# Patient Record
Sex: MALE | Race: WHITE | URBAN - METROPOLITAN AREA
[De-identification: names, ages, dates, MRNs, and addresses within clinical notes are randomized per-mention and may not be internally consistent; named-entity substitution may affect disease eponyms.]

---

## 2017-07-18 ENCOUNTER — MEDICAL CORRESPONDENCE (OUTPATIENT)
Dept: HEALTH INFORMATION MANAGEMENT | Facility: CLINIC | Age: 22
End: 2017-07-18

## 2017-07-25 ENCOUNTER — OFFICE VISIT (OUTPATIENT)
Dept: PLASTIC SURGERY | Facility: CLINIC | Age: 22
End: 2017-07-25

## 2017-07-25 VITALS
WEIGHT: 238.3 LBS | OXYGEN SATURATION: 97 % | DIASTOLIC BLOOD PRESSURE: 84 MMHG | HEART RATE: 99 BPM | TEMPERATURE: 99.3 F | SYSTOLIC BLOOD PRESSURE: 123 MMHG | HEIGHT: 65 IN | BODY MASS INDEX: 39.7 KG/M2

## 2017-07-25 DIAGNOSIS — F64.0 GENDER DYSPHORIA IN ADOLESCENT AND ADULT: Primary | ICD-10-CM

## 2017-07-25 RX ORDER — FLUTICASONE PROPIONATE 50 MCG
1 SPRAY, SUSPENSION (ML) NASAL DAILY
COMMUNITY

## 2017-07-25 ASSESSMENT — PAIN SCALES - GENERAL: PAINLEVEL: NO PAIN (0)

## 2017-07-25 NOTE — LETTER
"7/25/2017       RE: Andres Beal  [address]   [facility] [address]     Dear Colleague,    Thank you for referring your patient, Andres Beal, [facility] at [facility]. Please see a copy of my visit note below.    PLASTICS NEW    This 22 year old trans male is here for possible top surgery. He has already had his legal name and gender marker changed in January of this year. Andres was referred by some of our previous patients and came down from [place] to see us.   He has been on testosterone for almost 2 years now with an endocrinologist at [facility].   He has been seeing his therapist Juan Subramanian for the past year.  He has been living his chosen gender role for 2 years now. He binds using a GC2B compression garment and denies any breast problems.    PMH: gender dysphoria. Anxiety/depression - off meds due to side effects. Will restart when he finds a new PMD that is more experienced and supportive with transgender issues. Has exercise/allergy/cold weather induced asthma- uses inhaler PRN about 2 times per week. Denies KIARA, DM, GERD, bleeding/clotting or healing/scarring issues.     PSH: 3rd molar extractions.    FHX: no breast or ovarian CA. Grandparents had CVA, MI, HTN, obesity. Mother has diabetes.    SHX: works as  for [employer]. Has a partner but lives alone. Non-smoker, occasional use of ETOH. Diet- vegetarian, moderate carbs and snacks. Not much pop or coffee. Eats \"fake meat\" and legumes for protein source. Exercise - walks on job, hiking. Sleep= 8-9 hours without problems.     PE: 5'5\", 238 lbs. Masculine appearing biological female.   Moderate breast volume with grade 2-3 ptosis. Decent symmetry but both IMFs at least 1-2 cms too low on thorax. Good pectoralis development and anterior chest contour. Male pattern hair growth and healthy skin elasticity. No masses or nodes on palpation. " Breast tissue fairly fibrofatty. Minimal lateral thoracic rolls. Photos taken with written consent.     A/P: biological female transitioning to male. Meets WPATH criteria for gender affirming top surgery. Will require bilateral SQ mastectomies with nipple grafts.   Has MA so will need to have therapist fax letter of support to our prior auth specialist. Will also need to find a new PMD and get back on his meds for depression and get documentation of mental health clearance. He is hoping for a surgery date sometime in the early fall if possible, but before 2018 for sure.    Total time= 30 minutes, greater than half spent discussing surgical options and insurance issues. Additional discussions about risks and complications as well as a periop cares and limitations will be carried out during his preop visit once we have a surgery date.    Again, thank you for allowing me to participate in the care of your patient.      Sincerely,    Rianna Morales MD

## 2017-07-25 NOTE — NURSING NOTE
"Chief Complaint   Patient presents with     Consult     consult       Vitals:    07/25/17 1141   BP: 123/84   BP Location: Right arm   Patient Position: Chair   Cuff Size: Adult Regular   Pulse: 99   Temp: 99.3  F (37.4  C)   SpO2: 97%   Weight: 238 lb 4.8 oz   Height: 5' 5\"       Body mass index is 39.66 kg/(m^2).    Perez Ornelas MA                          "

## 2017-07-25 NOTE — MR AVS SNAPSHOT
"              After Visit Summary   7/25/2017    Andres Beal    MRN: [id number]           Patient Information     Date Of Birth          1995        Visit Information        Provider Department      7/25/2017 11:00 AM Rianna Morales MD [facility] Plastic and Reconstructive Surgery        Today's Diagnoses     Gender dysphoria in adolescent and adult    -  1       Follow-ups after your visit        Who to contact     Please call your clinic at [phone] to:    Ask questions about your health    Make or cancel appointments    Discuss your medicines    Learn about your test results    Speak to your doctor   If you have compliments or concerns about an experience at your clinic, or if you wish to file a complaint, please contact [facility] Patient Relations at [phone] or email us at [email or web address]         Additional Information About Your Visit        MyChart Information     [employer] gives you secure access to your electronic health record. If you see a primary care provider, you can also send messages to your care team and make appointments. If you have questions, please call your primary care clinic.  If you do not have a primary care provider, please call [phone] and they will assist you.      [email or web address] is an electronic gateway that provides easy, online access to your medical records. With [email or web address], you can request a clinic appointment, read your test results, renew a prescription or communicate with your care team.     To access your existing account, please contact your [facility] Clinic or call [phone] for assistance.        Care EveryWhere ID     This is your Care EveryWhere ID. This could be used by other organizations to access your [facility] medical records  [id number]        Your Vitals Were     Pulse Temperature Height Pulse Oximetry BMI (Body Mass Index)       99 99.3  F (37.4  C) 5' 5\" 97% 39.66 kg/m2        " Blood Pressure from Last 3 Encounters:   07/25/17 123/84    Weight from Last 3 Encounters:   07/25/17 238 lb 4.8 oz              Today, you had the following     No orders found for display       Primary Care Provider    None Specified       No primary provider on file.        Equal Access to Services     CARLOS STILL : Hadii aad ku hadraquelobdulia Mike, wadelmada luqadaha, alexanderta kaalmada geovaniibrahimaheidy, waxadele kimani alexandreanggali noe. So [facility] [phone].    ATENCIÓN: Si habla español, tiene a smith disposición servicios gratuitos de asistencia lingüística. Llame al [phone].    We comply with applicable federal civil rights laws and [place] laws. We do not discriminate on the basis of race, color, national origin, age, disability sex, sexual orientation or gender identity.            Thank you!     Thank you for choosing [facility] PLASTIC AND RECONSTRUCTIVE SURGERY  for your care. Our goal is always to provide you with excellent care. Hearing back from our patients is one way we can continue to improve our services. Please take a few minutes to complete the written survey that you may receive in the mail after your visit with us. Thank you!             Your Updated Medication List - Protect others around you: Learn how to safely use, store and throw away your medicines at www.disposemymeds.org.          This list is accurate as of: 7/25/17 11:59 PM.  Always use your most recent med list.                   Brand Name Dispense Instructions for use Diagnosis    fluticasone 50 MCG/ACT spray    FLONASE     Spray 1 spray into both nostrils daily

## 2017-07-31 ENCOUNTER — MEDICAL CORRESPONDENCE (OUTPATIENT)
Dept: HEALTH INFORMATION MANAGEMENT | Facility: CLINIC | Age: 22
End: 2017-07-31

## 2017-08-06 NOTE — PROGRESS NOTES
"PLASTICS NEW    This 22 year old trans male is here for possible top surgery. He has already had his legal name and gender marker changed in January of this year. Andres was referred by some of our previous patients and came down from [place] to see us.   He has been on testosterone for almost 2 years now with an endocrinologist at [facility].   He has been seeing his therapist Juan Subramanian for the past year.  He has been living his chosen gender role for 2 years now. He binds using a GC2B compression garment and denies any breast problems.    PMH: gender dysphoria. Anxiety/depression - off meds due to side effects. Will restart when he finds a new PMD that is more experienced and supportive with transgender issues. Has exercise/allergy/cold weather induced asthma- uses inhaler PRN about 2 times per week. Denies KIARA, DM, GERD, bleeding/clotting or healing/scarring issues.     PSH: 3rd molar extractions.    FHX: no breast or ovarian CA. Grandparents had CVA, MI, HTN, obesity. Mother has diabetes.    SHX: works as  for [employer]. Has a partner but lives alone. Non-smoker, occasional use of ETOH. Diet- vegetarian, moderate carbs and snacks. Not much pop or coffee. Eats \"fake meat\" and legumes for protein source. Exercise - walks on job, hiking. Sleep= 8-9 hours without problems.     PE: 5'5\", 238 lbs. Masculine appearing biological female.   Moderate breast volume with grade 2-3 ptosis. Decent symmetry but both IMFs at least 1-2 cms too low on thorax. Good pectoralis development and anterior chest contour. Male pattern hair growth and healthy skin elasticity. No masses or nodes on palpation. Breast tissue fairly fibrofatty. Minimal lateral thoracic rolls. Photos taken with written consent.     A/P: biological female transitioning to male. Meets WPATH criteria for gender affirming top surgery. Will require bilateral SQ mastectomies with nipple grafts.   Has MA so will need to have " therapist fax letter of support to our prior auth specialist. Will also need to find a new PMD and get back on his meds for depression and get documentation of mental health clearance. He is hoping for a surgery date sometime in the early fall if possible, but before 2018 for sure.    Total time= 30 minutes, greater than half spent discussing surgical options and insurance issues. Additional discussions about risks and complications as well as a periop cares and limitations will be carried out during his preop visit once we have a surgery date.

## 2017-09-12 ENCOUNTER — TELEPHONE (OUTPATIENT)
Dept: SURGERY | Facility: CLINIC | Age: 22
End: 2017-09-12

## 2017-09-12 NOTE — TELEPHONE ENCOUNTER
Received patient phone call, [facility], will call me back with id#

## 2017-10-03 ENCOUNTER — TELEPHONE (OUTPATIENT)
Dept: SURGERY | Facility: CLINIC | Age: 22
End: 2017-10-03

## 2017-10-03 NOTE — TELEPHONE ENCOUNTER
I talked to [email or web address], verified coverage.  Sent to  for codes to use. Called patient and let him know, I verified insurance information and will begin prior authorization request soon

## 2017-10-04 ENCOUNTER — TELEPHONE (OUTPATIENT)
Dept: SURGERY | Facility: CLINIC | Age: 22
End: 2017-10-04

## 2017-10-04 NOTE — TELEPHONE ENCOUNTER
Faxed prior authorization request to [facility] for bilateral subcutaneous mastectomes

## 2017-10-06 ENCOUNTER — TELEPHONE (OUTPATIENT)
Dept: SURGERY | Facility: CLINIC | Age: 22
End: 2017-10-06

## 2017-10-06 NOTE — TELEPHONE ENCOUNTER
Received [facility] prior authorization approval #[id number] for bilateral subcutaneous mastectomy

## 2018-01-23 ENCOUNTER — OFFICE VISIT (OUTPATIENT)
Dept: PLASTIC SURGERY | Facility: CLINIC | Age: 23
End: 2018-01-23
Payer: COMMERCIAL

## 2018-01-23 VITALS
BODY MASS INDEX: 39.65 KG/M2 | DIASTOLIC BLOOD PRESSURE: 80 MMHG | WEIGHT: 238 LBS | HEART RATE: 90 BPM | OXYGEN SATURATION: 100 % | HEIGHT: 65 IN | SYSTOLIC BLOOD PRESSURE: 120 MMHG

## 2018-01-23 DIAGNOSIS — F64.0 GENDER DYSPHORIA IN ADOLESCENT AND ADULT: Primary | ICD-10-CM

## 2018-01-23 RX ORDER — LORAZEPAM 1 MG/1
0.5-1 TABLET ORAL EVERY 8 HOURS PRN
Qty: 4 TABLET | Refills: 0 | Status: SHIPPED | OUTPATIENT
Start: 2018-01-23

## 2018-01-23 RX ORDER — TESTOSTERONE CYPIONATE 200 MG/ML
200 INJECTION, SOLUTION INTRAMUSCULAR
COMMUNITY

## 2018-01-23 ASSESSMENT — PAIN SCALES - GENERAL: PAINLEVEL: NO PAIN (0)

## 2018-01-23 NOTE — NURSING NOTE
"Chief Complaint   Patient presents with     RECHECK     Pre Op        Vitals:    01/23/18 0941   BP: 120/80   Pulse: 90   SpO2: 100%   Weight: 238 lb   Height: 5' 5\"       Body mass index is 39.61 kg/(m^2).      Samm LIM                  "

## 2018-01-23 NOTE — LETTER
Date:February 2, 2018      Patient was self referred, no letter generated. Do not send.        [facility] Health Information

## 2018-01-23 NOTE — LETTER
1/23/2018       RE: Andres Beal  [address]   [facility] [address]     Dear Colleague,    Thank you for referring your patient, Andres Beal, to the [facility] PLASTIC AND RECONSTRUCTIVE SURGERY at [facility]. Please see a copy of my visit note below.    PLASTICS PREOP    This 22 year old trans male from [place] is here to discuss his upcoming top surgery.  He is scheduled for bilateral SQ mastectomies with nipple grafts on 2/7/18.  He had his H&P yesterday and labs are pending.  We did not require a preop mammogram.   Other than MVI, he really takes no regular meds. I did encourage him to bring his rescue inhaler just in case.   Since he has a history of PONV, we will make sure that he talks with anesthesia in preop.     We discussed the possible risks and complications, as well as perioperative cares and limitations for his procedure.  Periop instructions included: NPO after midnight except for am meds with sip of water, preop shower with surgical soap. The events of the day of surgery were explained - including preop, intraop and postop phases of care. The patient wanted specific details about the surgical technique.  We also went over the possible risks and complications involved with this elective procedure. These include but are not limited to: infection, bleeding, hematoma/seroma formation, poor healing - including dehiscence, nipple graft loss, hypertrophic scarring. Altered chest sensation- either hypo or hypersensitive, residual deformities and asymmetries, possible further surgical revisions, possible injury to surrounding neurovascular and musculoskeletal structures, including intra-axillary or intra-thoracic, anesthetic risks such as DVT/PE or cardiopulmonary events.  Postop cares and limitations were discussed with relation to his home and work settings.    Since he also has issues with anxiety, we had him sign a consent form today to scan into the  system so he can have some anti-anxiety meds while waiting for surgery.  I gave him a Rx for 0.5mg lorazepam, #4 to help with sleep and waiting.   Since he is not involved in sexual relationship with a cis-male, he should be able to waive a urine pregnancy test.     He seemed satisfied with the information given and will write down any additional questions he wants to talk about on the day of surgery.     TT= 45 minutes, all spent educating patient regarding upcoming surgery, do's and don'ts, periop cares, etc.         Again, thank you for allowing me to participate in the care of your patient.      Sincerely,    Rainna Morales MD

## 2018-01-31 ENCOUNTER — ANESTHESIA EVENT (OUTPATIENT)
Dept: SURGERY | Facility: CLINIC | Age: 23
End: 2018-01-31
Payer: COMMERCIAL

## 2018-02-01 NOTE — PROGRESS NOTES
PLASTICS PREOP    This 22 year old trans male from [place] is here to discuss his upcoming top surgery.  He is scheduled for bilateral SQ mastectomies with nipple grafts on 2/7/18.  He had his H&P yesterday and labs are pending.  We did not require a preop mammogram.   Other than MVI, he really takes no regular meds. I did encourage him to bring his rescue inhaler just in case.   Since he has a history of PONV, we will make sure that he talks with anesthesia in preop.     We discussed the possible risks and complications, as well as perioperative cares and limitations for his procedure.  Periop instructions included: NPO after midnight except for am meds with sip of water, preop shower with surgical soap. The events of the day of surgery were explained - including preop, intraop and postop phases of care. The patient wanted specific details about the surgical technique.  We also went over the possible risks and complications involved with this elective procedure. These include but are not limited to: infection, bleeding, hematoma/seroma formation, poor healing - including dehiscence, nipple graft loss, hypertrophic scarring. Altered chest sensation- either hypo or hypersensitive, residual deformities and asymmetries, possible further surgical revisions, possible injury to surrounding neurovascular and musculoskeletal structures, including intra-axillary or intra-thoracic, anesthetic risks such as DVT/PE or cardiopulmonary events.  Postop cares and limitations were discussed with relation to his home and work settings.    Since he also has issues with anxiety, we had him sign a consent form today to scan into the system so he can have some anti-anxiety meds while waiting for surgery.  I gave him a Rx for 0.5mg lorazepam, #4 to help with sleep and waiting.   Since he is not involved in sexual relationship with a cis-male, he should be able to waive a urine pregnancy test.     He seemed satisfied with the information  given and will write down any additional questions he wants to talk about on the day of surgery.     TT= 45 minutes, all spent educating patient regarding upcoming surgery, do's and don'ts, periop cares, etc.

## 2018-02-06 NOTE — ANESTHESIA PREPROCEDURE EVALUATION
Physical Exam  Normal systems: cardiovascular, pulmonary and dental    Airway   Mallampati: I  TM distance: >3 FB  Neck ROM: full    Dental     Cardiovascular   Rhythm and rate: regular and normal      Pulmonary    breath sounds clear to auscultation                    Anesthesia Plan      History & Physical Review  History and physical reviewed and following examination; no interval change.    ASA Status:  2 .    NPO Status:  > 8 hours    Plan for General and ETT with Intravenous and Propofol induction. Maintenance will be Balanced.    PONV prophylaxis:  Ondansetron (or other 5HT-3) and Dexamethasone or Solumedrol       Postoperative Care  Postoperative pain management:  IV analgesics, Oral pain medications and Multi-modal analgesia.      Consents  Anesthetic plan, risks, benefits and alternatives discussed with:  Patient..          ANESTHESIA PREOP EVALUATION    PROCEDURE: Procedure(s):  Bilateral Subcutaneous Mastectomy With Nipple Grafts - Wound Class:     HPI: Andres Beal is a 22 year old male who presents for above procedure.    PAST MEDICAL HISTORY:    Past Medical History:   Diagnosis Date     Uncomplicated asthma        PAST SURGICAL HISTORY:    Past Surgical History:   Procedure Laterality Date     [facility]         PAST ANESTHESIA HISTORY:     No personal or family h/o anesthesia problems    SOCIAL HISTORY:       Social History   Substance Use Topics     Smoking status: Never Smoker     Smokeless tobacco: Never Used     Alcohol use Yes      Comment: 0 - 2 per week       ALLERGIES:     No Known Allergies    MEDICATIONS:     No prescriptions prior to admission.       Current Outpatient Prescriptions   Medication Sig Dispense Refill     testosterone cypionate (DEPOTESTOTERONE) 200 MG/ML injection Inject 200 mg into the muscle every 14 days       LORazepam (ATIVAN) 1 MG tablet Take 0.5-1 tablets (0.5-1 mg) by mouth every 8 hours as needed for anxiety Take 30 minutes prior to departure.   Do not operate a vehicle after taking this medication 4 tablet 0     fluticasone (FLONASE) 50 MCG/ACT spray Spray 1 spray into both nostrils daily         No current Epic-ordered facility-administered medications on file.      Current Outpatient Prescriptions Ordered in [facility]   Medication Sig Dispense Refill     testosterone cypionate (DEPOTESTOTERONE) 200 MG/ML injection Inject 200 mg into the muscle every 14 days       LORazepam (ATIVAN) 1 MG tablet Take 0.5-1 tablets (0.5-1 mg) by mouth every 8 hours as needed for anxiety Take 30 minutes prior to departure.  Do not operate a vehicle after taking this medication 4 tablet 0     fluticasone (FLONASE) 50 MCG/ACT spray Spray 1 spray into both nostrils daily         PHYSICAL EXAM:    Vitals: T Data Unavailable, P Data Unavailable, BP Data Unavailable, R Data Unavailable, SpO2  , Weight   Wt Readings from Last 2 Encounters:   01/23/18 108 kg (238 lb)   07/25/17 108.1 kg (238 lb 4.8 oz)       See doc flowsheet      No Data Recorded    No Data Recorded    No Data Recorded    No Data Recorded    No Data Recorded    No data recorded.  No data recorded.      NPO STATUS: see doc flowsheet    LABS:    BMP:  No results for input(s): NA, POTASSIUM, CHLORIDE, CO2, BUN, CR, GLC, TANJA in the last 68870 hours.    LFTs:   No results for input(s): PROTTOTAL, ALBUMIN, BILITOTAL, ALKPHOS, AST, ALT, BILIDIRECT in the last 84421 hours.    CBC:   No results for input(s): WBC, RBC, HGB, HCT, MCV, MCH, MCHC, RDW, PLT in the last 29075 hours.    Coags:  No results for input(s): INR, PTT, FIBR in the last 90760 hours.    Imaging:  No orders to display       Taz Henao MD  Anesthesiology Staff  Pager [phone]    2/6/2018  9:34 AM                  .

## 2018-02-07 ENCOUNTER — HOSPITAL ENCOUNTER (OUTPATIENT)
Facility: CLINIC | Age: 23
Setting detail: OBSERVATION
Discharge: HOME | End: 2018-02-08
Attending: SURGERY | Admitting: SURGERY
Payer: COMMERCIAL

## 2018-02-07 ENCOUNTER — SURGERY (OUTPATIENT)
Age: 23
End: 2018-02-07

## 2018-02-07 ENCOUNTER — ANESTHESIA (OUTPATIENT)
Dept: SURGERY | Facility: CLINIC | Age: 23
End: 2018-02-07
Payer: COMMERCIAL

## 2018-02-07 DIAGNOSIS — F64.0 GENDER DYSPHORIA IN ADULT: Primary | ICD-10-CM

## 2018-02-07 PROBLEM — Z90.13 S/P BILATERAL MASTECTOMY: Status: ACTIVE | Noted: 2018-02-07

## 2018-02-07 LAB — GLUCOSE BLDC GLUCOMTR-MCNC: 86 MG/DL (ref 70–99)

## 2018-02-07 PROCEDURE — 88305 TISSUE EXAM BY PATHOLOGIST: CPT | Mod: 26 | Performed by: SURGERY

## 2018-02-07 PROCEDURE — 88305 TISSUE EXAM BY PATHOLOGIST: CPT | Performed by: SURGERY

## 2018-02-07 PROCEDURE — 37000008 ZZH ANESTHESIA TECHNICAL FEE, 1ST 30 MIN: Performed by: SURGERY

## 2018-02-07 PROCEDURE — 27110038 ZZH RX 271: Performed by: SURGERY

## 2018-02-07 PROCEDURE — 25000132 ZZH RX MED GY IP 250 OP 250 PS 637: Performed by: STUDENT IN AN ORGANIZED HEALTH CARE EDUCATION/TRAINING PROGRAM

## 2018-02-07 PROCEDURE — 25000566 ZZH SEVOFLURANE, EA 15 MIN: Performed by: SURGERY

## 2018-02-07 PROCEDURE — 71000015 ZZH RECOVERY PHASE 1 LEVEL 2 EA ADDTL HR: Performed by: SURGERY

## 2018-02-07 PROCEDURE — 40000170 ZZH STATISTIC PRE-PROCEDURE ASSESSMENT II: Performed by: SURGERY

## 2018-02-07 PROCEDURE — 36000057 ZZH SURGERY LEVEL 3 1ST 30 MIN - UMMC: Performed by: SURGERY

## 2018-02-07 PROCEDURE — 25000125 ZZHC RX 250: Performed by: REGISTERED NURSE

## 2018-02-07 PROCEDURE — G0378 HOSPITAL OBSERVATION PER HR: HCPCS

## 2018-02-07 PROCEDURE — 37000009 ZZH ANESTHESIA TECHNICAL FEE, EACH ADDTL 15 MIN: Performed by: SURGERY

## 2018-02-07 PROCEDURE — 25000132 ZZH RX MED GY IP 250 OP 250 PS 637: Performed by: SURGERY

## 2018-02-07 PROCEDURE — 25000125 ZZHC RX 250: Performed by: ANESTHESIOLOGY

## 2018-02-07 PROCEDURE — 25000125 ZZHC RX 250: Performed by: SURGERY

## 2018-02-07 PROCEDURE — 25000128 H RX IP 250 OP 636: Performed by: STUDENT IN AN ORGANIZED HEALTH CARE EDUCATION/TRAINING PROGRAM

## 2018-02-07 PROCEDURE — 71000014 ZZH RECOVERY PHASE 1 LEVEL 2 FIRST HR: Performed by: SURGERY

## 2018-02-07 PROCEDURE — 00000146 ZZHCL STATISTIC GLUCOSE BY METER IP

## 2018-02-07 PROCEDURE — 25000132 ZZH RX MED GY IP 250 OP 250 PS 637: Performed by: REGISTERED NURSE

## 2018-02-07 PROCEDURE — 27210995 ZZH RX 272: Performed by: SURGERY

## 2018-02-07 PROCEDURE — 25000128 H RX IP 250 OP 636: Performed by: NURSE ANESTHETIST, CERTIFIED REGISTERED

## 2018-02-07 PROCEDURE — 25000125 ZZHC RX 250: Performed by: NURSE ANESTHETIST, CERTIFIED REGISTERED

## 2018-02-07 PROCEDURE — 25000128 H RX IP 250 OP 636: Performed by: REGISTERED NURSE

## 2018-02-07 PROCEDURE — 25800025 ZZH RX 258: Performed by: SURGERY

## 2018-02-07 PROCEDURE — 25000128 H RX IP 250 OP 636: Performed by: SURGERY

## 2018-02-07 PROCEDURE — C9399 UNCLASSIFIED DRUGS OR BIOLOG: HCPCS | Performed by: REGISTERED NURSE

## 2018-02-07 PROCEDURE — 27210794 ZZH OR GENERAL SUPPLY STERILE: Performed by: SURGERY

## 2018-02-07 PROCEDURE — 36000059 ZZH SURGERY LEVEL 3 EA 15 ADDTL MIN UMMC: Performed by: SURGERY

## 2018-02-07 RX ORDER — OXYCODONE HYDROCHLORIDE 5 MG/1
5-10 TABLET ORAL EVERY 6 HOURS PRN
Qty: 50 TABLET | Refills: 0 | Status: SHIPPED | OUTPATIENT
Start: 2018-02-07 | End: 2018-02-13

## 2018-02-07 RX ORDER — MEPERIDINE HYDROCHLORIDE 25 MG/ML
12.5 INJECTION INTRAMUSCULAR; INTRAVENOUS; SUBCUTANEOUS
Status: DISCONTINUED | OUTPATIENT
Start: 2018-02-07 | End: 2018-02-07 | Stop reason: HOSPADM

## 2018-02-07 RX ORDER — ONDANSETRON 2 MG/ML
4 INJECTION INTRAMUSCULAR; INTRAVENOUS EVERY 30 MIN PRN
Status: DISCONTINUED | OUTPATIENT
Start: 2018-02-07 | End: 2018-02-07 | Stop reason: HOSPADM

## 2018-02-07 RX ORDER — ACETAMINOPHEN 325 MG/1
975 TABLET ORAL ONCE
Status: COMPLETED | OUTPATIENT
Start: 2018-02-07 | End: 2018-02-07

## 2018-02-07 RX ORDER — FENTANYL CITRATE 50 UG/ML
INJECTION, SOLUTION INTRAMUSCULAR; INTRAVENOUS PRN
Status: DISCONTINUED | OUTPATIENT
Start: 2018-02-07 | End: 2018-02-07

## 2018-02-07 RX ORDER — FENTANYL CITRATE 50 UG/ML
25-50 INJECTION, SOLUTION INTRAMUSCULAR; INTRAVENOUS
Status: DISCONTINUED | OUTPATIENT
Start: 2018-02-07 | End: 2018-02-07 | Stop reason: HOSPADM

## 2018-02-07 RX ORDER — SODIUM CHLORIDE, SODIUM LACTATE, POTASSIUM CHLORIDE, CALCIUM CHLORIDE 600; 310; 30; 20 MG/100ML; MG/100ML; MG/100ML; MG/100ML
INJECTION, SOLUTION INTRAVENOUS CONTINUOUS
Status: DISCONTINUED | OUTPATIENT
Start: 2018-02-07 | End: 2018-02-07 | Stop reason: HOSPADM

## 2018-02-07 RX ORDER — PROPOFOL 10 MG/ML
INJECTION, EMULSION INTRAVENOUS PRN
Status: DISCONTINUED | OUTPATIENT
Start: 2018-02-07 | End: 2018-02-07

## 2018-02-07 RX ORDER — DIMENHYDRINATE 50 MG/ML
25 INJECTION, SOLUTION INTRAMUSCULAR; INTRAVENOUS
Status: DISCONTINUED | OUTPATIENT
Start: 2018-02-07 | End: 2018-02-07 | Stop reason: HOSPADM

## 2018-02-07 RX ORDER — ONDANSETRON 4 MG/1
4 TABLET, FILM COATED ORAL EVERY 8 HOURS PRN
Qty: 20 TABLET | Refills: 1 | Status: SHIPPED | OUTPATIENT
Start: 2018-02-07 | End: 2018-02-13

## 2018-02-07 RX ORDER — NALOXONE HYDROCHLORIDE 0.4 MG/ML
.1-.4 INJECTION, SOLUTION INTRAMUSCULAR; INTRAVENOUS; SUBCUTANEOUS
Status: DISCONTINUED | OUTPATIENT
Start: 2018-02-07 | End: 2018-02-07 | Stop reason: HOSPADM

## 2018-02-07 RX ORDER — ONDANSETRON 4 MG/1
4 TABLET, ORALLY DISINTEGRATING ORAL EVERY 30 MIN PRN
Status: DISCONTINUED | OUTPATIENT
Start: 2018-02-07 | End: 2018-02-07 | Stop reason: HOSPADM

## 2018-02-07 RX ORDER — ACETAMINOPHEN 325 MG/1
650 TABLET ORAL EVERY 6 HOURS PRN
Status: DISCONTINUED | OUTPATIENT
Start: 2018-02-07 | End: 2018-02-08 | Stop reason: HOSPADM

## 2018-02-07 RX ORDER — AZITHROMYCIN 250 MG/1
TABLET, FILM COATED ORAL
Qty: 6 TABLET | Refills: 0 | Status: SHIPPED | OUTPATIENT
Start: 2018-02-07 | End: 2018-02-13

## 2018-02-07 RX ORDER — LIDOCAINE 40 MG/G
CREAM TOPICAL
Status: DISCONTINUED | OUTPATIENT
Start: 2018-02-07 | End: 2018-02-08 | Stop reason: HOSPADM

## 2018-02-07 RX ORDER — DEXTROSE MONOHYDRATE, SODIUM CHLORIDE, AND POTASSIUM CHLORIDE 50; 1.49; 4.5 G/1000ML; G/1000ML; G/1000ML
INJECTION, SOLUTION INTRAVENOUS CONTINUOUS
Status: DISCONTINUED | OUTPATIENT
Start: 2018-02-07 | End: 2018-02-08 | Stop reason: HOSPADM

## 2018-02-07 RX ORDER — HYDROXYZINE HYDROCHLORIDE 25 MG/1
25-50 TABLET, FILM COATED ORAL EVERY 6 HOURS PRN
Qty: 30 TABLET | Refills: 1 | Status: SHIPPED | OUTPATIENT
Start: 2018-02-07 | End: 2018-02-13

## 2018-02-07 RX ORDER — ONDANSETRON 4 MG/1
4 TABLET, ORALLY DISINTEGRATING ORAL EVERY 6 HOURS PRN
Status: DISCONTINUED | OUTPATIENT
Start: 2018-02-07 | End: 2018-02-08 | Stop reason: HOSPADM

## 2018-02-07 RX ORDER — EPHEDRINE SULFATE 50 MG/ML
INJECTION, SOLUTION INTRAMUSCULAR; INTRAVENOUS; SUBCUTANEOUS PRN
Status: DISCONTINUED | OUTPATIENT
Start: 2018-02-07 | End: 2018-02-07

## 2018-02-07 RX ORDER — ALBUTEROL SULFATE 90 UG/1
AEROSOL, METERED RESPIRATORY (INHALATION) PRN
Status: DISCONTINUED | OUTPATIENT
Start: 2018-02-07 | End: 2018-02-07

## 2018-02-07 RX ORDER — HYDROXYZINE HYDROCHLORIDE 25 MG/1
25 TABLET, FILM COATED ORAL EVERY 6 HOURS PRN
Status: DISCONTINUED | OUTPATIENT
Start: 2018-02-07 | End: 2018-02-08 | Stop reason: HOSPADM

## 2018-02-07 RX ORDER — LIDOCAINE 40 MG/G
CREAM TOPICAL
Status: DISCONTINUED | OUTPATIENT
Start: 2018-02-07 | End: 2018-02-07 | Stop reason: HOSPADM

## 2018-02-07 RX ORDER — LIDOCAINE HYDROCHLORIDE 20 MG/ML
INJECTION, SOLUTION INFILTRATION; PERINEURAL PRN
Status: DISCONTINUED | OUTPATIENT
Start: 2018-02-07 | End: 2018-02-07

## 2018-02-07 RX ORDER — METOCLOPRAMIDE HYDROCHLORIDE 5 MG/ML
10 INJECTION INTRAMUSCULAR; INTRAVENOUS EVERY 6 HOURS PRN
Status: DISCONTINUED | OUTPATIENT
Start: 2018-02-07 | End: 2018-02-08 | Stop reason: HOSPADM

## 2018-02-07 RX ORDER — CEFAZOLIN SODIUM 1 G/3ML
1 INJECTION, POWDER, FOR SOLUTION INTRAMUSCULAR; INTRAVENOUS SEE ADMIN INSTRUCTIONS
Status: DISCONTINUED | OUTPATIENT
Start: 2018-02-07 | End: 2018-02-07 | Stop reason: HOSPADM

## 2018-02-07 RX ORDER — BUPIVACAINE HYDROCHLORIDE 5 MG/ML
INJECTION, SOLUTION PERINEURAL PRN
Status: DISCONTINUED | OUTPATIENT
Start: 2018-02-07 | End: 2018-02-07 | Stop reason: HOSPADM

## 2018-02-07 RX ORDER — HYDROMORPHONE HYDROCHLORIDE 1 MG/ML
0.2 INJECTION, SOLUTION INTRAMUSCULAR; INTRAVENOUS; SUBCUTANEOUS
Status: DISCONTINUED | OUTPATIENT
Start: 2018-02-07 | End: 2018-02-08 | Stop reason: HOSPADM

## 2018-02-07 RX ORDER — SCOLOPAMINE TRANSDERMAL SYSTEM 1 MG/1
1 PATCH, EXTENDED RELEASE TRANSDERMAL ONCE
Status: COMPLETED | OUTPATIENT
Start: 2018-02-07 | End: 2018-02-07

## 2018-02-07 RX ORDER — EPHEDRINE SULFATE 50 MG/ML
5 INJECTION, SOLUTION INTRAVENOUS ONCE
Status: COMPLETED | OUTPATIENT
Start: 2018-02-07 | End: 2018-02-07

## 2018-02-07 RX ORDER — PROCHLORPERAZINE MALEATE 5 MG
10 TABLET ORAL EVERY 6 HOURS PRN
Status: DISCONTINUED | OUTPATIENT
Start: 2018-02-07 | End: 2018-02-08 | Stop reason: HOSPADM

## 2018-02-07 RX ORDER — DEXAMETHASONE SODIUM PHOSPHATE 4 MG/ML
INJECTION, SOLUTION INTRA-ARTICULAR; INTRALESIONAL; INTRAMUSCULAR; INTRAVENOUS; SOFT TISSUE PRN
Status: DISCONTINUED | OUTPATIENT
Start: 2018-02-07 | End: 2018-02-07

## 2018-02-07 RX ORDER — NALOXONE HYDROCHLORIDE 0.4 MG/ML
.1-.4 INJECTION, SOLUTION INTRAMUSCULAR; INTRAVENOUS; SUBCUTANEOUS
Status: DISCONTINUED | OUTPATIENT
Start: 2018-02-07 | End: 2018-02-08 | Stop reason: HOSPADM

## 2018-02-07 RX ORDER — GABAPENTIN 300 MG/1
300 CAPSULE ORAL ONCE
Status: COMPLETED | OUTPATIENT
Start: 2018-02-07 | End: 2018-02-07

## 2018-02-07 RX ORDER — METOCLOPRAMIDE 10 MG/1
10 TABLET ORAL EVERY 6 HOURS PRN
Status: DISCONTINUED | OUTPATIENT
Start: 2018-02-07 | End: 2018-02-08 | Stop reason: HOSPADM

## 2018-02-07 RX ORDER — ONDANSETRON 2 MG/ML
4 INJECTION INTRAMUSCULAR; INTRAVENOUS EVERY 6 HOURS PRN
Status: DISCONTINUED | OUTPATIENT
Start: 2018-02-07 | End: 2018-02-08 | Stop reason: HOSPADM

## 2018-02-07 RX ORDER — CEFAZOLIN SODIUM 2 G/100ML
2 INJECTION, SOLUTION INTRAVENOUS
Status: COMPLETED | OUTPATIENT
Start: 2018-02-07 | End: 2018-02-07

## 2018-02-07 RX ORDER — OXYCODONE HYDROCHLORIDE 5 MG/1
5-10 TABLET ORAL
Status: DISCONTINUED | OUTPATIENT
Start: 2018-02-07 | End: 2018-02-08 | Stop reason: HOSPADM

## 2018-02-07 RX ADMIN — EPHEDRINE SULFATE 5 MG: 50 INJECTION, SOLUTION INTRAVENOUS at 21:45

## 2018-02-07 RX ADMIN — PROCHLORPERAZINE EDISYLATE 5 MG: 5 INJECTION INTRAMUSCULAR; INTRAVENOUS at 17:52

## 2018-02-07 RX ADMIN — CEFAZOLIN SODIUM 2 G: 2 INJECTION, SOLUTION INTRAVENOUS at 13:10

## 2018-02-07 RX ADMIN — SCOLOPAMINE TRANSDERMAL SYSTEM 1 PATCH: 1 PATCH, EXTENDED RELEASE TRANSDERMAL at 19:58

## 2018-02-07 RX ADMIN — LIDOCAINE HYDROCHLORIDE 100 MG: 20 INJECTION, SOLUTION INFILTRATION; PERINEURAL at 12:51

## 2018-02-07 RX ADMIN — PHENYLEPHRINE HYDROCHLORIDE 100 MCG: 10 INJECTION, SOLUTION INTRAMUSCULAR; INTRAVENOUS; SUBCUTANEOUS at 14:24

## 2018-02-07 RX ADMIN — HYDROMORPHONE HYDROCHLORIDE 0.3 MG: 1 INJECTION, SOLUTION INTRAMUSCULAR; INTRAVENOUS; SUBCUTANEOUS at 18:50

## 2018-02-07 RX ADMIN — FENTANYL CITRATE 50 MCG: 50 INJECTION, SOLUTION INTRAMUSCULAR; INTRAVENOUS at 14:19

## 2018-02-07 RX ADMIN — SODIUM CHLORIDE, POTASSIUM CHLORIDE, SODIUM LACTATE AND CALCIUM CHLORIDE: 600; 310; 30; 20 INJECTION, SOLUTION INTRAVENOUS at 12:34

## 2018-02-07 RX ADMIN — ACETAMINOPHEN 975 MG: 325 TABLET, FILM COATED ORAL at 12:02

## 2018-02-07 RX ADMIN — Medication 5 MG: at 13:51

## 2018-02-07 RX ADMIN — POTASSIUM CHLORIDE, DEXTROSE MONOHYDRATE AND SODIUM CHLORIDE: 150; 5; 450 INJECTION, SOLUTION INTRAVENOUS at 22:57

## 2018-02-07 RX ADMIN — Medication 5 MG: at 14:09

## 2018-02-07 RX ADMIN — PHENYLEPHRINE HYDROCHLORIDE 100 MCG: 10 INJECTION, SOLUTION INTRAMUSCULAR; INTRAVENOUS; SUBCUTANEOUS at 15:24

## 2018-02-07 RX ADMIN — MIDAZOLAM 2 MG: 1 INJECTION INTRAMUSCULAR; INTRAVENOUS at 12:43

## 2018-02-07 RX ADMIN — BUPIVACAINE HYDROCHLORIDE 4 ML: 5 INJECTION, SOLUTION PERINEURAL at 13:41

## 2018-02-07 RX ADMIN — ROCURONIUM BROMIDE 10 MG: 10 INJECTION INTRAVENOUS at 14:17

## 2018-02-07 RX ADMIN — CEFAZOLIN SODIUM 1 G: 2 INJECTION, SOLUTION INTRAVENOUS at 15:19

## 2018-02-07 RX ADMIN — Medication 5 MG: at 14:42

## 2018-02-07 RX ADMIN — PROPOFOL 300 MG: 10 INJECTION, EMULSION INTRAVENOUS at 12:51

## 2018-02-07 RX ADMIN — ROCURONIUM BROMIDE 50 MG: 10 INJECTION INTRAVENOUS at 12:51

## 2018-02-07 RX ADMIN — ONDANSETRON 4 MG: 2 INJECTION INTRAMUSCULAR; INTRAVENOUS at 17:11

## 2018-02-07 RX ADMIN — FENTANYL CITRATE 50 MCG: 50 INJECTION, SOLUTION INTRAMUSCULAR; INTRAVENOUS at 13:26

## 2018-02-07 RX ADMIN — HYDROMORPHONE HYDROCHLORIDE 0.3 MG: 1 INJECTION, SOLUTION INTRAMUSCULAR; INTRAVENOUS; SUBCUTANEOUS at 18:05

## 2018-02-07 RX ADMIN — DEXAMETHASONE SODIUM PHOSPHATE 8 MG: 4 INJECTION, SOLUTION INTRAMUSCULAR; INTRAVENOUS at 13:26

## 2018-02-07 RX ADMIN — ONDANSETRON 4 MG: 2 INJECTION INTRAMUSCULAR; INTRAVENOUS at 16:13

## 2018-02-07 RX ADMIN — GABAPENTIN 300 MG: 300 CAPSULE ORAL at 12:02

## 2018-02-07 RX ADMIN — POLYMYXIN B SULFATE 1000 ML: 500000 INJECTION, POWDER, LYOPHILIZED, FOR SOLUTION INTRAMUSCULAR; INTRATHECAL; INTRAVENOUS; OPHTHALMIC at 13:56

## 2018-02-07 RX ADMIN — FENTANYL CITRATE 50 MCG: 50 INJECTION, SOLUTION INTRAMUSCULAR; INTRAVENOUS at 17:16

## 2018-02-07 RX ADMIN — Medication: at 16:20

## 2018-02-07 RX ADMIN — PHENYLEPHRINE HYDROCHLORIDE 100 MCG: 10 INJECTION, SOLUTION INTRAMUSCULAR; INTRAVENOUS; SUBCUTANEOUS at 14:40

## 2018-02-07 RX ADMIN — FENTANYL CITRATE 50 MCG: 50 INJECTION, SOLUTION INTRAMUSCULAR; INTRAVENOUS at 13:30

## 2018-02-07 RX ADMIN — SUGAMMADEX 200 MG: 100 INJECTION, SOLUTION INTRAVENOUS at 16:13

## 2018-02-07 RX ADMIN — PHENYLEPHRINE HYDROCHLORIDE 100 MCG: 10 INJECTION, SOLUTION INTRAMUSCULAR; INTRAVENOUS; SUBCUTANEOUS at 14:12

## 2018-02-07 RX ADMIN — PROCHLORPERAZINE EDISYLATE 5 MG: 5 INJECTION INTRAMUSCULAR; INTRAVENOUS at 19:01

## 2018-02-07 RX ADMIN — ALBUTEROL SULFATE 8 PUFF: 90 AEROSOL, METERED RESPIRATORY (INHALATION) at 16:08

## 2018-02-07 RX ADMIN — PHENYLEPHRINE HYDROCHLORIDE 150 MCG: 10 INJECTION, SOLUTION INTRAMUSCULAR; INTRAVENOUS; SUBCUTANEOUS at 13:49

## 2018-02-07 RX ADMIN — HYDROMORPHONE HYDROCHLORIDE 0.2 MG: 1 INJECTION, SOLUTION INTRAMUSCULAR; INTRAVENOUS; SUBCUTANEOUS at 14:49

## 2018-02-07 RX ADMIN — FENTANYL CITRATE 100 MCG: 50 INJECTION, SOLUTION INTRAMUSCULAR; INTRAVENOUS at 12:51

## 2018-02-07 RX ADMIN — RANITIDINE 150 MG: 150 TABLET ORAL at 22:57

## 2018-02-07 RX ADMIN — PHENYLEPHRINE HYDROCHLORIDE 150 MCG: 10 INJECTION, SOLUTION INTRAMUSCULAR; INTRAVENOUS; SUBCUTANEOUS at 13:51

## 2018-02-07 RX ADMIN — SODIUM CHLORIDE, POTASSIUM CHLORIDE, SODIUM LACTATE AND CALCIUM CHLORIDE: 600; 310; 30; 20 INJECTION, SOLUTION INTRAVENOUS at 14:24

## 2018-02-07 RX ADMIN — FENTANYL CITRATE 25 MCG: 50 INJECTION, SOLUTION INTRAMUSCULAR; INTRAVENOUS at 16:55

## 2018-02-07 NOTE — OR NURSING
Pt HR elevated at 156.  /54. Pt reports pain of 5/10 in chest area.  Dr. Powell notified of vitals.  Administered 25mcg fentanyl IV at this time per Dr. Powell's request.

## 2018-02-07 NOTE — IP AVS SNAPSHOT
[facility] [address]    [address]    [facility] [address]    Phone:  [phone]                                       After Visit Summary   2/7/2018    Andres Bela    MRN: [id number]           After Visit Summary Signature Page     I have received my discharge instructions, and my questions have been answered. I have discussed any challenges I see with this plan with the nurse or doctor.    ..........................................................................................................................................  Patient/Patient Representative Signature      ..........................................................................................................................................  Patient Representative Print Name and Relationship to Patient    ..................................................               ................................................  Date                                            Time    ..........................................................................................................................................  Reviewed by Signature/Title    ...................................................              ..............................................  Date                                                            Time

## 2018-02-07 NOTE — IP AVS SNAPSHOT
MRN:[id number]                      After Visit Summary   2/7/2018    Andres Beal    MRN: [id number]           Thank you!     Thank you for choosing [facility] for your care. Our goal is always to provide you with excellent care. Hearing back from our patients is one way we can continue to improve our services. Please take a few minutes to complete the written survey that you may receive in the mail after you visit with us. Thank you!        Patient Information     Date Of Birth          1995        Designated Caregiver       Most Recent Value    Caregiver    Will someone help with your care after discharge? yes    Name of designated caregiver Agata Carmichael    Phone number of caregiver [phone]    Caregiver address [address] [place] [id number]      About your hospital stay     You were admitted on:  February 7, 2018 You last received care in the:  [facility]    You were discharged on:  February 8, 2018        Reason for your hospital stay       S/p bilateral subcutaneous mastectomies with nipple grafts. OnQ pain catheters.  Tolerated under GA.  OK to dc home per anesthesia criteria.                  Who to Call     For medical emergencies, please call 911.  For non-urgent questions about your medical care, please call your primary care provider or clinic, None  For questions related to your surgery, please call your surgery clinic        Attending Provider     Provider Specialty    Rianna Morales MD Plastic Surgery       Primary Care Provider Fax #    Provider Not In System [phone]       When to contact your care team       Call Plastic Surgery Clinic during daytime hours (Karina: [phone], OR Mago: [phone]), OR the hospital  for nights/ weekends ([phone]) to speak with plastic surgery on-call resident IF you have any of the following: temperature >102.5, increased bleeding noted in drains or under skin, reactions to meds, reactions to pain pump  "(tingling around lips or ringing in ears), any problems with drains/pain catheters/or dressings.                  After Care Instructions     Activity       Your activity upon discharge: no heavy lifting > 5 lbs x 3 weeks. AVOID excessive/ extreme use of upper body/ extremities x 3 weeks. OK to do gentle movements for daily cares.  AVOID direct trauma to surgical sites.  OK to sleep on your back or modified side position (may be uncomfortable with incisions and drains on the side). CANNOT sleep on stomach for at least the first 2 weeks.  May want to consider sleeping with pillows to prevent from rolling over.   Some patients prefer to sleep in a recliner to prevent rolling, but elevation is not required.            Diet       Follow this diet upon discharge: Advance to a regular diet as tolerated.  Drink plenty of fluids to help prevent constipation.  Get plenty of protein, vitamins and minerals (fruits and veggies)  to help with healing.  Can resume usual preop meds and supplements as tolerated.            Discharge Instructions - diet       Resume pre procedure diet.            Do not immerse incision in water        until the sutures are removed.            Dressing       Keep dressing clean and dry.  Dressing / incision care as instructed by Provider and/or Nurse.            Monitor and record       COCO drain output EVERY morning and EVERY night.  Usually between 30-50 mls per day, but don't be alarmed is more or less. May not be equal between sides. Will probably drop off when pain pump is empty.  Usually fluid looks like cherry Koolaid at first, then Hawaiian punch color, then peach tea over time.  May notice protein \"clots\" that look like \"worms\" in the tubing. Do not be alarmed -this is just precipitated protein from the fluid that is weeping from your raw tissues, much like what you see when you scrape your knee.  We DO want to be notified IF: there is increasing amount of fresh bright red blood that rapidly " fills the suction bulb after emptying. OR if blood collecting under the skin and causing significant painful swelling on one side (expanding hematoma).            NO lifting       NO lifting over  5  lbs and no strenuous physical activity for  3  weeks.            Supplies       List the supplies the pt needs to go home:  PLEASE send supplies for COCO drain cares.  Please instruct caregivers on drain cares.  Please send home with spare ACE wraps from OR.  THANK YOU            Tubes and drains       You are going home with the following tubes or drains: COCO.  Follow these instructions  to care for your tube.  STRIP tubing and MEASURE/RECORD output Qam & Qpm.  Please bring output record to follow up appointment.  MAKE SURE NURSES INSTRUCT ON DRAIN CARES BEFORE GOING HOME.            Wound care and dressings       Instructions to care for your wound at home: as directed.  Keep nipple graft dressings DRY. NO SHOWERS until after follow up appointment.   OK to rewrap ACE if too tight or too loose.   Do NOT mess with dressings underneath ACE wrap or nipple grafts dressings.                  Follow-up Appointments     Follow Up and recommended labs and tests       Follow up with Dr Morales within 1 week at [address] [place] [address] to evaluate after surgery.  No follow up labs or test are needed.                  Your next 10 appointments already scheduled     Feb 13, 2018 10:30 AM CST   (Arrive by 10:15 AM)   Post-Op with Rianna Morales MD   [facility] Plastic [facility] ([facility])    [address] [place] [id number] [phone]              Further instructions from your care team       Same-Day Surgery   Adult Discharge Orders & Instructions     For 24 hours after surgery:  1. Get plenty of rest.  A responsible adult must stay with you for at least 24 hours after you leave the hospital.   2. Pain medication can slow your reflexes. Do not drive or use  heavy equipment.  If you have weakness or tingling, don't drive or use heavy equipment until this feeling goes away.  3. Mixing alcohol and pain medication can cause dizziness and slow your breathing. It can even be fatal. Do not drink alcohol while taking pain medication.  4. Avoid strenuous or risky activities.  Ask for help when climbing stairs.   5. You may feel lightheaded.  If so, sit for a few minutes before standing.  Have someone help you get up.   6. If you have nausea (feel sick to your stomach), drink only clear liquids such as apple juice, ginger ale, broth or 7-Up.  Rest may also help.  Be sure to drink enough fluids.  Move to a regular diet as you feel able. Take pain medications with a small amount of solid food, such as toast or crackers, to avoid nausea.   7. A slight fever is normal. Call the doctor if your fever is over 100 F (37.7 C) (taken under the tongue) or lasts longer than 24 hours.  8. You may have a dry mouth, muscle aches, trouble sleeping or a sore throat.  These symptoms should go away after 24 hours.  9. Do not make important or legal decisions.   Pain Management:      1. Take pain medication (if prescribed) for pain as directed by your physician.        2. WARNING: If the pain medication you have been prescribed contains Tylenol  (acetaminophen), DO NOT take additional doses of Tylenol (acetaminophen).     Call your doctor for any of the followin.  Signs of infection (fever, growing tenderness at the surgery site, severe pain, a large amount of drainage or bleeding, foul-smelling drainage, redness, swelling).    2.  It has been over 8 to 10 hours since surgery and you are still not able to urinate (pee).    3.  Headache for over 24 hours.    4.  Numbness, tingling or weakness the day after surgery (if you had spinal anesthesia).  To contact a doctor, call Dr. Marin:      [phone] and ask for the Resident On Call for:         Plastic surgery (answered 24 hours a day)      " Emergency Department:  [facility]: [phone]  [facility]: [phone]               Rev. 10/2014       Pending Results     Date and Time Order Name Status Description    2/7/2018 1429 Surgical pathology exam In process             Admission Information     Date & Time Provider Department Dept. Phone    2/7/2018 Rianna Morales MD [facility] [phone]      Your Vitals Were     Blood Pressure Temperature Respirations Height Weight Pulse Oximetry    111/44 (BP Location: Right arm) 98.2  F (36.8  C) (Oral) 23 1.651 m (5' 5\") 99.6 kg (219 lb 9.3 oz) 98%    BMI (Body Mass Index)                   36.54 kg/m2           [employer] Information     [employer] gives you secure access to your electronic health record. If you see a primary care provider, you can also send messages to your care team and make appointments. If you have questions, please call your primary care clinic.  If you do not have a primary care provider, please call [phone] and they will assist you.        Care EveryWhere ID     This is your Care EveryWhere ID. This could be used by other organizations to access your [facility] medical records  [id number]        Equal Access to Services     CARLOS STILL AH: Moses Mckeon, wadelmada sushil, qaybta kaalmada fiorella, cristela noe. So [facility] [phone].    ATENCIÓN: Si habla español, tiene a smith disposición servicios gratuitos de asistencia lingüística. Llame al [phone].    We comply with applicable federal civil rights laws and [place] laws. We do not discriminate on the basis of race, color, national origin, age, disability, sex, sexual orientation, or gender identity.               Review of your medicines      START taking        Dose / Directions    azithromycin 250 MG tablet   Commonly known as:  ZITHROMAX   Used for:  Gender dysphoria in adult        Two tablets first day, then one tablet daily for four " days.   Quantity:  6 tablet   Refills:  0       hydrOXYzine 25 MG tablet   Commonly known as:  ATARAX   Used for:  Gender dysphoria in adult        Dose:  25-50 mg   Take 1-2 tablets (25-50 mg) by mouth every 6 hours as needed for itching or anxiety   Quantity:  30 tablet   Refills:  1       ondansetron 4 MG tablet   Commonly known as:  ZOFRAN   Used for:  Gender dysphoria in adult        Dose:  4 mg   Take 1 tablet (4 mg) by mouth every 8 hours as needed for nausea   Quantity:  20 tablet   Refills:  1       oxyCODONE IR 5 MG tablet   Commonly known as:  ROXICODONE   Used for:  Gender dysphoria in adult        Dose:  5-10 mg   Take 1-2 tablets (5-10 mg) by mouth every 6 hours as needed for pain maximum 8 tablet(s) per day   Quantity:  50 tablet   Refills:  0         CONTINUE these medicines which have NOT CHANGED        Dose / Directions    fluticasone 50 MCG/ACT spray   Commonly known as:  FLONASE        Dose:  1 spray   Spray 1 spray into both nostrils daily   Refills:  0       LORazepam 1 MG tablet   Commonly known as:  ATIVAN   Used for:  Gender dysphoria in adolescent and adult        Dose:  0.5-1 mg   Take 0.5-1 tablets (0.5-1 mg) by mouth every 8 hours as needed for anxiety Take 30 minutes prior to departure.  Do not operate a vehicle after taking this medication   Quantity:  4 tablet   Refills:  0       testosterone cypionate 200 MG/ML injection   Commonly known as:  DEPOTESTOTERONE        Dose:  200 mg   Inject 200 mg into the muscle every 14 days   Refills:  0            Where to get your medicines      These medications were sent to [facility] [place], [place] - [address]  [address] [address]     Phone:  [phone]     azithromycin 250 MG tablet    hydrOXYzine 25 MG tablet    ondansetron 4 MG tablet         Some of these will need a paper prescription and others can be bought over the counter. Ask your nurse if you have questions.     Bring a paper  prescription for each of these medications     oxyCODONE IR 5 MG tablet                Protect others around you: Learn how to safely use, store and throw away your medicines at www.disposemymeds.org.        ANTIBIOTIC INSTRUCTION     You've Been Prescribed an Antibiotic - Now What?  Your healthcare team thinks that you or your loved one might have an infection. Some infections can be treated with antibiotics, which are powerful, life-saving drugs. Like all medications, antibiotics have side effects and should only be used when necessary. There are some important things you should know about your antibiotic treatment.      Your healthcare team may run tests before you start taking an antibiotic.    Your team may take samples (e.g., from your blood, urine or other areas) to run tests to look for bacteria. These test can be important to determine if you need an antibiotic at all and, if you do, which antibiotic will work best.      Within a few days, your healthcare team might change or even stop your antibiotic.    Your team may start you on an antibiotic while they are working to find out what is making you sick.    Your team might change your antibiotic because test results show that a different antibiotic would be better to treat your infection.    In some cases, once your team has more information, they learn that you do not need an antibiotic at all. They may find out that you don't have an infection, or that the antibiotic you're taking won't work against your infection. For example, an infection caused by a virus can't be treated with antibiotics. Staying on an antibiotic when you don't need it is more likely to be harmful than helpful.      You may experience side effects from your antibiotic.    Like all medications, antibiotics have side effects. Some of these can be serious.    Let you healthcare team know if you have any known allergies when you are admitted to the hospital.    One significant side effect  of nearly all antibiotics is the risk of severe and sometimes deadly diarrhea caused by Clostridium difficile (C. Difficile). This occurs when a person takes antibiotics because some good germs are destroyed. Antibiotic use allows C. diificile to take over, putting patients at high risk for this serious infection.    As a patient or caregiver, it is important to understand your or your loved one's antibiotic treatment. It is especially important for caregivers to speak up when patients can't speak for themselves. Here are some important questions to ask your healthcare team.    What infection is this antibiotic treating and how do you know I have that infection?    What side effects might occur from this antibiotic?    How long will I need to take this antibiotic?    Is it safe to take this antibiotic with other medications or supplements (e.g., vitamins) that I am taking?     Are there any special directions I need to know about taking this antibiotic? For example, should I take it with food?    How will I be monitored to know whether my infection is responding to the antibiotic?    What tests may help to make sure the right antibiotic is prescribed for me?      Information provided by:  www.cdc.gov/getsmart  U.S. Department of Health and Human Services  Centers for disease Control and Prevention  National Center for Emerging and Zoonotic Infectious Diseases  Division of Healthcare Quality Promotion        Information about OPIOIDS     PRESCRIPTION OPIOIDS: WHAT YOU NEED TO KNOW    Prescription opioids can be used to help relieve moderate to severe pain and are often prescribed following a surgery or injury, or for certain health conditions. These medications can be an important part of treatment but also come with serious risks. It is important to work with your health care provider to make sure you are getting the safest, most effective care.    WHAT ARE THE RISKS AND SIDE EFFECTS OF OPIOID USE?  Prescription  opioids carry serious risks of addiction and overdose, especially with prolonged use. An opioid overdose, often marked by slowed breathing can cause sudden death. The use of prescription opioids can have a number of side effects as well, even when taken as directed:      Tolerance - meaning you might need to take more of a medication for the same pain relief    Physical dependence - meaning you have symptoms of withdrawal when a medication is stopped    Increased sensitivity to pain    Constipation    Nausea, vomiting, and dry mouth    Sleepiness and dizziness    Confusion    Depression    Low levels of testosterone that can result in lower sex drive, energy, and strength    Itching and sweating    RISKS ARE GREATER WITH:    History of drug misuse, substance use disorder, or overdose    Mental health conditions (such as depression or anxiety)    Sleep apnea    Older age (65 years or older)    Pregnancy    Avoid alcohol while taking prescription opioids.   Also, unless specifically advised by your health care provider, medications to avoid include:    Benzodiazepines (such as Xanax or Valium)    Muscle relaxants (such as Soma or Flexeril)    Hypnotics (such as Ambien or Lunesta)    Other prescription opioids    KNOW YOUR OPTIONS:  Talk to your health care provider about ways to manage your pain that do not involve prescription opioids. Some of these options may actually work better and have fewer risks and side effects:    Pain relievers such as acetaminophen, ibuprofen, and naproxen    Some medications that are also used for depression or seizures    Physical therapy and exercise    Cognitive behavioral therapy, a psychological, goal-directed approach, in which patients learn how to modify physical, behavioral, and emotional triggers of pain and stress    IF YOU ARE PRESCRIBED OPIOIDS FOR PAIN:    Never take opioids in greater amounts or more often than prescribed    Follow up with your primary health care provider  and work together to create a plan on how to manage your pain.    Talk about ways to help manage your pain that do not involve prescription opioids    Talk about all concerns and side effects    Help prevent misuse and abuse    Never sell or share prescription opioids    Never use another person's prescription opioids    Store prescription opioids in a secure place and out of reach of others (this may include visitors, children, friends, and family)    Visit www.cdc.gov/drugoverdose to learn about risks of opioid abuse and overdose    If you believe you may be struggling with addiction, tell your health care provider and ask for guidance or call [facility]'s National Helpline at 7-271-094-HELP    LEARN MORE / www.cdc.gov/drugoverdose/prescribing/guideline.html    Safely dispose of unused prescription opioids: Find your local drug take-back programs and more information about the importance of safe disposal at www.doseofreality.[place].gov             Medication List: This is a list of all your medications and when to take them. Check marks below indicate your daily home schedule. Keep this list as a reference.      Medications           Morning Afternoon Evening Bedtime As Needed    azithromycin 250 MG tablet   Commonly known as:  ZITHROMAX   Two tablets first day, then one tablet daily for four days.                                fluticasone 50 MCG/ACT spray   Commonly known as:  FLONASE   Spray 1 spray into both nostrils daily                                hydrOXYzine 25 MG tablet   Commonly known as:  ATARAX   Take 1-2 tablets (25-50 mg) by mouth every 6 hours as needed for itching or anxiety                                LORazepam 1 MG tablet   Commonly known as:  ATIVAN   Take 0.5-1 tablets (0.5-1 mg) by mouth every 8 hours as needed for anxiety Take 30 minutes prior to departure.  Do not operate a vehicle after taking this medication                                ondansetron 4 MG tablet   Commonly known as:  ZOFRAN    Take 1 tablet (4 mg) by mouth every 8 hours as needed for nausea                                oxyCODONE IR 5 MG tablet   Commonly known as:  ROXICODONE   Take 1-2 tablets (5-10 mg) by mouth every 6 hours as needed for pain maximum 8 tablet(s) per day                                testosterone cypionate 200 MG/ML injection   Commonly known as:  DEPOTESTOTERONE   Inject 200 mg into the muscle every 14 days

## 2018-02-07 NOTE — ANESTHESIA CARE TRANSFER NOTE
Patient: Andres Beal    Procedure(s):  Bilateral Subcutaneous Mastectomy With Nipple Grafts - Wound Class: I-Clean    Diagnosis: Transgender/Gender Dysphoria  Diagnosis Additional Information: No value filed.    Anesthesia Type:   General, ETT     Note:  Airway :Face Mask  Patient transferred to:PACU  Comments: Pt to PACU, spontaneous rr on FM 02, vss report given to RNHandoff Report: Identifed the Patient, Identified the Reponsible Provider, Reviewed the pertinent medical history, Discussed the surgical course, Reviewed Intra-OP anesthesia mangement and issues during anesthesia, Set expectations for post-procedure period and Allowed opportunity for questions and acknowledgement of understanding      Vitals: (Last set prior to Anesthesia Care Transfer)    CRNA VITALS  2/7/2018 1606 - 2/7/2018 1641      2/7/2018             Pulse: 138    Ht Rate: (!)  0    SpO2: 98 %    Resp Rate (observed): 23                Electronically Signed By: MOSEH Light CRNA  February 7, 2018  4:41 PM

## 2018-02-07 NOTE — BRIEF OP NOTE
[facility], [place]    Brief Operative Note    Pre-operative diagnosis: Transgender/Gender Dysphoria  Post-operative diagnosis Same  Procedure: Procedure(s):  Bilateral Subcutaneous Mastectomy With Nipple Grafts - Wound Class: I-Clean  Surgeon: Surgeon(s) and Role:     * Rianna Morales MD - Primary     * Lencho Sanchez - Assisting  Anesthesia: General   Estimated blood loss: 25 mL  UOP: 200mL  Fluids: 1600cc crystalloid  Drains: Marcus-Reyes and On-Q pump  Specimens:   ID Type Source Tests Collected by Time Destination   1 :  Tissue Breast, Right OR DOCUMENTATION ONLY Rianna Morales MD 2/7/2018  2:03 PM    2 :  Tissue Breast, Left OR DOCUMENTATION ONLY Rianna Morales MD 2/7/2018  2:02 PM    A : Left Breast Tissue Tissue Breast, Left SURGICAL PATHOLOGY EXAM Rianna Morales MD 2/7/2018  3:43 PM    B :  Tissue Breast, Left SURGICAL PATHOLOGY EXAM Rianna Morales MD 2/7/2018  2:02 PM    C :  Tissue Breast, Right SURGICAL PATHOLOGY EXAM Rianna Morales MD 2/7/2018  3:46 PM      Findings:   right breast 673g removed, left 469g removed.  Complications: None.  Implants: None.

## 2018-02-07 NOTE — OR NURSING
Dr. Powell at bedside.   at this time, /62. Pt reports pain still 5/10.  Fluid bolus initiated and 50mcg IV fentanyl administered at this time per Dr. Powell's request.

## 2018-02-08 VITALS
TEMPERATURE: 98.2 F | WEIGHT: 219.58 LBS | BODY MASS INDEX: 36.58 KG/M2 | HEIGHT: 65 IN | DIASTOLIC BLOOD PRESSURE: 44 MMHG | RESPIRATION RATE: 23 BRPM | SYSTOLIC BLOOD PRESSURE: 111 MMHG | OXYGEN SATURATION: 98 %

## 2018-02-08 PROCEDURE — G0378 HOSPITAL OBSERVATION PER HR: HCPCS

## 2018-02-08 PROCEDURE — 25000132 ZZH RX MED GY IP 250 OP 250 PS 637: Performed by: SURGERY

## 2018-02-08 RX ADMIN — RANITIDINE 150 MG: 150 TABLET ORAL at 10:26

## 2018-02-08 RX ADMIN — ACETAMINOPHEN 650 MG: 325 TABLET, FILM COATED ORAL at 10:37

## 2018-02-08 ASSESSMENT — ACTIVITIES OF DAILY LIVING (ADL)
DRESS: 0-->INDEPENDENT
BATHING: 0-->INDEPENDENT
TRANSFERRING: 0-->INDEPENDENT
AMBULATION: 0-->INDEPENDENT
RETIRED_EATING: 0-->INDEPENDENT
SWALLOWING: 0-->SWALLOWS FOODS/LIQUIDS WITHOUT DIFFICULTY
TOILETING: 0-->INDEPENDENT
RETIRED_COMMUNICATION: 0-->UNDERSTANDS/COMMUNICATES WITHOUT DIFFICULTY
COGNITION: 0 - NO COGNITION ISSUES REPORTED
FALL_HISTORY_WITHIN_LAST_SIX_MONTHS: YES

## 2018-02-08 NOTE — PLAN OF CARE
Problem: Patient Care Overview  Goal: Individualization & Mutuality  Outcome: Improving  Patient A&O x4, lungs sound clear, Bowel sound active, Denied CP, lightheadedness, dizziness, numbness, tingling and SOB, iv line removed, drinking well and voiding spontaneously without difficulties, ate breakfast and tolerated well, no complain of nausea or vomiting, dressing on both breast clean,dry and intact, denied pain, both JPs intact, On Q- pump settng at 4 cc/hr, demonstrates the ability to use call light appropriately, will continue to monitor patient and prepare for discharge today.

## 2018-02-08 NOTE — ANESTHESIA POSTPROCEDURE EVALUATION
Patient: Andres Beal    Procedure(s):  Bilateral Subcutaneous Mastectomy With Nipple Grafts - Wound Class: I-Clean    Diagnosis:Transgender/Gender Dysphoria  Diagnosis Additional Information: No value filed.    Anesthesia Type:  General, ETT    Note:  Anesthesia Post Evaluation    Patient location during evaluation: PACU and Bedside  Patient participation: Able to fully participate in evaluation  Level of consciousness: awake and alert  Pain management: adequate  Airway patency: patent  Cardiovascular status: acceptable  Respiratory status: acceptable  Hydration status: balanced  PONV: none     Anesthetic complications: None    Comments: Pt with significant PONV.  Pt has used 4 different anti emetic agents.  Pt to be admitted.        Last vitals:  Vitals:    02/07/18 1915 02/07/18 2000 02/07/18 2015   BP: 112/67 125/57 118/51   Resp: 16 14    Temp:      SpO2: 93% 96% 94%         Electronically Signed By: Zoë Powell MD  February 7, 2018  9:01 PM

## 2018-02-08 NOTE — OR NURSING
Every time he woke up  The nausea was still there. Sitting up in bed caused him to vomit. Ephedrine given Im, prior to transfer to the floor he thought maybe the scope patch was helping. Able to take a few sips of water without emesis prior to transfer. Dressing remain dry and intacr. Discharge meds given to significant other YM. Report given to Colette Henriquez RN and she trasferred patient to the floor

## 2018-02-08 NOTE — PLAN OF CARE
Problem: Patient Care Overview  Goal: Plan of Care/Patient Progress Review  Observation Goals:   -Vital signs at baseline.   -Able to tolerate oral intake.     0000: Vitals stable Temp: 97.1  F (36.2  C) Temp src: Oral BP: 126/65   Heart Rate: 118 (just getting back from bathroom) Resp: 16 SpO2: 99 % O2 Device: None (Room air) Oxygen Delivery: 2 LPM EtCO2: 42 IPI: 10  Tolerated jello.   0200: Sleeping. No nausea/vomiting.    0400: Sleeping. No nausea/vomiting.  0600: No nausea/vomiting. Vitals stable Temp: 98.2  F (36.8  C) Temp src: Oral BP: 111/44   Heart Rate: 100 Resp: 23 SpO2: 98 % O2 Device: None (Room air) Advanced to regular diet for breakfast meal.     VS: Pt A&Ox 4. VSS on room air. Incentive spirometer used while awake. CAPNO WNL.   Output: Voids spontaneously without difficulty in the toilet. Pt LBM 2/7 and is passing gas   Activity: Pt up with standby assist and IV pole.    Skin: Intact except bilateral chest incision   Pain: Denying pain. Q-pump infusing.     CMS: CMS and Neuro's are intact. Patient denies numbness and tingling in all extremities. PCD's on BLE.    Dressing: Bilateral chest incisional dressings remain CDI.   Diet:  Advanced to regular diet for breakfast meal. Denies nausea.    LDA:   PIV is patent in left hand and D51/2NS+20K infusing at 100 mL/hr  Right COCO drain to bulb suction, bloody/bright red output  Left COCO drain to bulb suction, bloody/bright red output   Sleep: Normal.    Plan: Continue to monitor pt and anticipate discharge to home once all observation goals are met.

## 2018-02-08 NOTE — OR NURSING
PACU to Inpatient Nursing Handoff    Patient Andres Beal is a 22 year old male who speaks English.   Procedure Procedure(s):  Bilateral Subcutaneous Mastectomy With Nipple Grafts - Wound Class: I-Clean   Surgeon(s) Primary: Rianna Morales MD  Assisting: Lencho Sanchez     No Known Allergies    Isolation  No active isolations    Past Medical History   has a past medical history of Uncomplicated asthma.    Anesthesia General   Dermatome Level     Preop Meds acetaminophen (Tylenol) - time given: 1202  gabapentin (Neurontin) - time given: 1202   Nerve block Not applicable   Intraop Meds dexamethasone (Decadron)  fentanyl (Sublimaze): 250 mcg total  hydromorphone (Dilaudid): .2 mg total  ondansetron (Zofran): last given at 1613   Local Meds No   Antibiotics cefazolin (Ancef) - last given at 1519     Pain Patient Currently in Pain: sleeping: patient not able to self report  Comfort: comfortably manageable   PACU meds  fentanyl (Sublimaze): 75 mcg (total dose) last given at 1716   hydromorphone (Dilaudid): .6 mg (total dose) last given at 1850   ondansetron (Zofran): 4 mg (total dose) last given at 1711   prochlorperazine (Compazine): 10 mg (total dose) last given at 1900    PCA / epidural No   Capnography     Telemetry ECG Rhythm: Sinus tachycardia      Labs Glucose No results found for: GLC    Hgb No results found for: HGB    INR No results found for: INR   PACU Imaging Not applicable     Wound/Incision Incision/Surgical Site 02/07/18 Bilateral Chest (Active)   Incision Assessment UTV 2/7/2018  7:30 PM   Ruthie-Incision Assessment UTV 2/7/2018  7:30 PM   Closure DANIEL 2/7/2018  7:30 PM   Incision Drainage Amount None 2/7/2018  7:30 PM   Dressing Intervention Clean, dry, intact 2/7/2018  7:30 PM   Number of days:0      CMS Peripheral Neurovascular WDL: WDL (02/07/18 1900)      Equipment Not applicable   Other LDA       IV Access Peripheral IV 02/07/18 Left Hand (Active)   Site Assessment WDL 2/7/2018  7:30  PM   Line Status Infusing 2/7/2018  7:30 PM   Phlebitis Scale 0-->no symptoms 2/7/2018  7:30 PM   Infiltration Scale 0 2/7/2018  7:30 PM   Infiltration Site Treatment Method  None 2/7/2018  4:38 PM   Extravasation? No 2/7/2018  4:38 PM   Number of days:0       Subcutaneous Catheter (Active)   Site Assessment [facility] 2/7/2018  7:00 PM   Line Status Infusing 2/7/2018  7:00 PM   Number of days:0       Subcutaneous Catheter (Active)   Site Assessment [facility] 2/7/2018  7:00 PM   Line Status Infusing 2/7/2018  7:00 PM   Number of days:0      Blood Products Not applicable EBL    25     Intake/Output Date 02/07/18 0700 - 02/08/18 0659   Shift 6220-5602 9171-4106 7725-1815 24 Hour Total   I  N  T  A  K  E   P.O.  30  30    I.V. 1000 1000  2000    Shift Total  (mL/kg) 1000  (10.04) 1030  (10.34)  2030  (20.38)   O  U  T  P  U  T   Urine 125 50  175    Emesis/NG output  130  130    Drains  50  50    Blood  25  25    Shift Total  (mL/kg) 125  (1.26) 255  (2.56)  380  (3.82)   Weight (kg) 99.6 99.6 99.6 99.6        Drains / Luna Closed/Suction Drain 1 Right Chest Other (Comment) 15 Kazakh (Active)   Site Description [facility] 2/7/2018  7:30 PM   Dressing Status Normal: Clean, Dry & Intact 2/7/2018  7:30 PM   Drainage Appearance Bloody/Bright Red 2/7/2018  7:30 PM   Status To bulb suction 2/7/2018  7:30 PM   Output (ml) 30 ml 2/7/2018  8:29 PM   Number of days:0       Closed/Suction Drain 2 Left Chest Other (Comment) 15 Kazakh (Active)   Site Description [facility] 2/7/2018  7:30 PM   Dressing Status Normal: Clean, Dry & Intact 2/7/2018  7:30 PM   Drainage Appearance Bloody/Bright Red 2/7/2018  7:30 PM   Status To bulb suction 2/7/2018  7:30 PM   Output (ml) 20 ml 2/7/2018  8:29 PM   Number of days:0       Urethral Catheter Latex;Straight-tip 16 fr (Active)   Number of days:0      Time of void PreOp Void Prior to Procedure: 1215 (02/07/18 1209)    PostOp     Bladder Scan     PO 30 mL (02/07/18 1900)  emesis     Vitals    B/P: 118/51  T: 98.2  F  (36.8  C)    Temp src: Oral  P:       Heart Rate: 132 (02/07/18 1900)     R: 14  O2:  SpO2: 94 %    O2 Device: None (Room air) (02/07/18 2015)    Oxygen Delivery: 2 LPM (02/07/18 1800)         Family/support present yes   Patient belongings Patient Belongings: clothing;shoes;body jewelry;cell phone/electronics;glasses (inhaler albuterol)  Disposition of Belongings: Locker (labeled and locked storage)   Patient transported on cart   DC meds/scripts (obs/outpt) Yes, meds     Special needs/considerations None   Tasks needing completion None       Nisreen Vela, RN  [facility] [phone]

## 2018-02-08 NOTE — OR NURSING
He is sleeping scope patch applied and he fell back to sleep. Will let him sleep awile and then see how he feels. Pulse rate has also decreased. BP stable

## 2018-02-08 NOTE — OP NOTE
Procedure Date: 02/07/2018      ATTENDING:  Rianna Morales MD      RESIDENT SURGEON:  Lnecho Sanchez MD      PREOPERATIVE DIAGNOSIS:  Gender dysphoria.      POSTOPERATIVE DIAGNOSIS:  Gender dysphoria.      PROCEDURE:  Bilateral subcutaneous mastectomies with nipple grafts and On-Q catheter placement.      ANESTHESIA:  GET.      ESTIMATED BLOOD LOSS:  25 mL.      INTRAVENOUS FLUIDS:  1600 mL.      URINE OUTPUT:  200 mL.      COUNTS:  Correct.      COMPLICATIONS:  None.      DRAINS:  COCO x 2.      SPECIMEN:  Right breast 673 g, left breast 469 grams.      INDICATIONS:  This is a 22-year-old biological female on the gender spectrum, who was diagnosed with gender dysphoria and met criteria for top surgery.  Due to the patient's asymmetry and large breast volume and ptosis, he would require double incisions and has requested nipple grafts.      DESCRIPTION OF PROCEDURE:  The patient was seen in the preoperative waiting area.  The operative sites were marked.  This included the sternal notch, the sternal midline, inframammary folds, extensions for possible dog ears laterally, vertical midline through the NAC and a transverse line from the mid humerus.  Informed consent was obtained after reviewing the possible risks and complications including, but not limited to the following:  Infection, bleeding, hematoma, seroma formation, poor healing, possible dehiscence, possible spitting sutures, possible hypertrophic scarring, altered sensation in the chest wall, either hyper or hyposensitivity, possible injury to surrounding neurovascular and musculoskeletal structures as well as intrathoracic and intra-axillary structures, residual deformities, need for further surgery and anesthetic risks such as DVT, PE and cardiopulmonary arrest.  The patient was then brought to the operating room and placed supine on the OR table.  After general anesthesia was administered, Luna was placed.  The arms were secured to arm boards with  6-inch Ace wraps.  The patient already had sequential compression devices on his lower extremities prior to induction.  Additional padding was placed under and over his thighs and forelegs along with safety straps.  The patient was then put into a sitting position to check for symmetry and adjustments were made as necessary.  When in supine position again, the chest/breast area was prepped and draped in the usual sterile fashion using ChloraPrep.  A timeout was taken and the proper patient and procedure were identified.  Inframammary fold incisions were made either with the peak plasma blade on the right side or scalpel and electrocautery on the left.  Our incisions were made and then further developed by beveling down to the pectoral fascia leaving approximately 2 cm of subcutaneous fat along the incision.  We then elevated the breast mound off the pec fascia.  Dissecting up towards the clavicle, medially towards the sternum and laterally around the lateral border of the pectoralis muscle.  Of note, this patient's pectoralis muscle was somewhat less developed and tapered laterally.  He also had some prominent costochondral junctions medially. When the breast was fully mobilized, it was pulled inferiorly and marked where it overlapped with the IMF incision.  Of note, the right breast was considerably larger than the left, at least by 200 grams.  The breast mounds were then amputated in a similar fashion.  Additional thinning of the superior skin flaps was done with cautery, then any additional tissue was weighed off the backtable and ultimately sent to pathology for histologic examination.  Of note, this patient did sign up for a BioNet study and so the initial breast mound was weighed and then sent to pathology for further processing.  When we were happy with our skin flaps and contouring, we then temporarily skin stapled our incisions and put the patient into a sitting position.  This allowed us to make additional  markings for tailoring to get better symmetry with regards to the level and shape of our incisions.  We also made markings for eliminating any dog ear posterolaterally.  These adjustments were made in a similar fashion, once again using the scalpel and Bovie to excise the tissue.  Of note, there was probably more of a squared off appearance on the right side and more of a tapered lateral thoracic appearance on the left.  Once we were done with our resection and tailoring, the dissection pocket was irrigated with triple antibiotic solution and hemostasis was achieved with cautery.  The #15 round COCO drains were introduced through separate stab wound incisions laterally and secured with 3-0 nylon suture.  Ten-inch dual On-Q catheters were placed percutaneously from the epigastrium and draped along the superior aspect of the dissection pocket.  These were secured with benzoin and Tegaderm.  Definitive closure was then achieved with 3-0 Vicryl deep dermal buried sutures interrupted versus 4-0 running subcuticular suture.  Dermabond Prineo was used to cover the incisions.  We then made a 1.5 to 2 cm diameter nipple graft templates and in the sitting position, this was placed to provide the most aesthetic and natural looking appearance.  This was approximately 3 cm above the IMF incision to the center of the nipple, 2.5 to 3 cm, and then at least 12.5 cm or more from the midline, since this patient has a very wide chest and is overweight.  That area was then de-epithelialized sharply with a #15 blade.  Hemostasis was achieved with direct pressure.  The nipple grafts that had been previously harvested before amputation of the breast mound had been kept wrapped in normal saline on the backtable and these were thinned aggressively with iris scissors.  This was to facilitate graft take.  The excess areola and excess nipple were trimmed and ultimately the nipple was secured with 5-0 fast absorbing gut interrupted and running  cuticular sutures.  A bolster dressing for the nipples was then created with Xeroform sheet and antibiotic-soaked cotton balls.  These were held in place with skin staples and 2-0 silk tie-overs.  ABDs were used as drain sponges and Kerlix rolls x 2 were used for anterior padding of the chest.  A double long 6-inch Ace wrap was then used to wrap circumferentially around the patient's chest.  The Luna was removed prior to extubation and the patient was transferred to a stretcher.  He was taken to the recovery room in satisfactory condition, having tolerated the procedure without difficulty or complication.  Of note, we did not have to join the incisions in the midline even though they were quite close.  We felt that this gave an anatomical visual of separateness of his pecs.  Final tissue weight 673 on the right, 469 on the left were sent to pathology.         PIA HUTSON MD             D: 2018   T: 2018   MT: BRITT      Name:     MARY MUIR   MRN:      [id number]        Account:        [id number]   :      1995           Procedure Date: 2018      Document: [id number]

## 2018-02-08 NOTE — PLAN OF CARE
Problem: Patient Care Overview  Goal: Plan of Care/Patient Progress Review  Outcome: Improving  Patient is A&O x 3,VS'S. . SATs 96% at RA. Had a emesis on arrival but was resolved after,now sleeping , pain manageable.Rt  COCO patent with output of 20 cc and left COCO was 15 cc. Dressing is CD&I . PIV patent with fluids infusing at 100 cc/hr. Family at bedside,will continue to monitor

## 2018-02-08 NOTE — OR NURSING
Continues to vomit when sitting up. Have tried seabands, scope patch that offer no relief. Dr. Morales here to see patient

## 2018-02-08 NOTE — DISCHARGE INSTRUCTIONS
Same-Day Surgery   Adult Discharge Orders & Instructions     For 24 hours after surgery:  1. Get plenty of rest.  A responsible adult must stay with you for at least 24 hours after you leave the hospital.   2. Pain medication can slow your reflexes. Do not drive or use heavy equipment.  If you have weakness or tingling, don't drive or use heavy equipment until this feeling goes away.  3. Mixing alcohol and pain medication can cause dizziness and slow your breathing. It can even be fatal. Do not drink alcohol while taking pain medication.  4. Avoid strenuous or risky activities.  Ask for help when climbing stairs.   5. You may feel lightheaded.  If so, sit for a few minutes before standing.  Have someone help you get up.   6. If you have nausea (feel sick to your stomach), drink only clear liquids such as apple juice, ginger ale, broth or 7-Up.  Rest may also help.  Be sure to drink enough fluids.  Move to a regular diet as you feel able. Take pain medications with a small amount of solid food, such as toast or crackers, to avoid nausea.   7. A slight fever is normal. Call the doctor if your fever is over 100 F (37.7 C) (taken under the tongue) or lasts longer than 24 hours.  8. You may have a dry mouth, muscle aches, trouble sleeping or a sore throat.  These symptoms should go away after 24 hours.  9. Do not make important or legal decisions.   Pain Management:      1. Take pain medication (if prescribed) for pain as directed by your physician.        2. WARNING: If the pain medication you have been prescribed contains Tylenol  (acetaminophen), DO NOT take additional doses of Tylenol (acetaminophen).     Call your doctor for any of the followin.  Signs of infection (fever, growing tenderness at the surgery site, severe pain, a large amount of drainage or bleeding, foul-smelling drainage, redness, swelling).    2.  It has been over 8 to 10 hours since surgery and you are still not able to urinate (pee).    3.   Headache for over 24 hours.    4.  Numbness, tingling or weakness the day after surgery (if you had spinal anesthesia).  To contact a doctor, call Dr. Marin:      [phone] and ask for the Resident On Call for:         Plastic surgery (answered 24 hours a day)      Emergency Department:  [place] [facility]: [phone]  [place] [facility]: [phone]               Rev. 10/2014

## 2018-02-08 NOTE — OR NURSING
Patient fell asleep after last episode of vomiting. Vital stable. Reviewed discharge instructions with significant other Ym.

## 2018-02-08 NOTE — OR NURSING
Discharge medications given to significant other ym. Andres is again sleeping yfn no further nausea. Heart rate has decreased also when sleeping. Lungs  Are clear.

## 2018-02-09 LAB — COPATH REPORT: NORMAL

## 2018-02-09 NOTE — DISCHARGE SUMMARY
[facility] Discharge Summary    Andres Beal MRN# [id number]   Age: 22 year old YOB: 1995     Date of Admission:  2/7/2018  Date of Discharge::  2/8/2018 11:36 AM  Admitting Physician:  Rianna Morales MD  Discharge Physician:  Lencho Sanchez MD, MD     Home clinic: Plastic surgery          Admission Diagnoses:   S/P bilateral mastectomy [Z90.13]          Discharge Diagnosis:   Transgender/Gender Dysphoria  Post-operative nausea and vomiting          Procedures:   Procedure(s): Bilateral subcutaneous mastectomies with nipple grafts and On-Q catheter placement.  2/7/18                Medications Prior to Admission:   See chart          Discharge Medications:     Discharge Medication List as of 2/8/2018 10:44 AM      START taking these medications    Details   oxyCODONE IR (ROXICODONE) 5 MG tablet Take 1-2 tablets (5-10 mg) by mouth every 6 hours as needed for pain maximum 8 tablet(s) per day, Disp-50 tablet, R-0, Local Print      ondansetron (ZOFRAN) 4 MG tablet Take 1 tablet (4 mg) by mouth every 8 hours as needed for nausea, Disp-20 tablet, R-1, E-Prescribe      hydrOXYzine (ATARAX) 25 MG tablet Take 1-2 tablets (25-50 mg) by mouth every 6 hours as needed for itching or anxiety, Disp-30 tablet, R-1, E-Prescribe      azithromycin (ZITHROMAX) 250 MG tablet Two tablets first day, then one tablet daily for four days., Disp-6 tablet, R-0, E-Prescribe         CONTINUE these medications which have NOT CHANGED    Details   testosterone cypionate (DEPOTESTOTERONE) 200 MG/ML injection Inject 200 mg into the muscle every 14 days, Historical      LORazepam (ATIVAN) 1 MG tablet Take 0.5-1 tablets (0.5-1 mg) by mouth every 8 hours as needed for anxiety Take 30 minutes prior to departure.  Do not operate a vehicle after taking this medication, Disp-4 tablet, R-0, Local Print      fluticasone (FLONASE) 50 MCG/ACT spray Spray 1 spray into both nostrils daily, Historical                    Consultations:   No consultations were requested during this admission          Brief History of Illness:   23 yo trans male who presented for elective subcutaneous mastectomy.  Post-operatively he experienced post-operative nausea and vomiting that required hospital observation.           Hospital Course:   The patient's hospital course was unremarkable.  He recovered as anticipated and experienced no post-operative complications. His nausea and vomiting improved with IV antiemetics.  On the day of discharge he was tolerating a diet and his pain was well controlled with oral pain medications.          Discharge Instructions and Follow-Up:   Discharge diet: Regular   Discharge activity: No heavy lifting, pushing, pulling for 2 week(s)   Discharge follow-up: Follow up with Dr. Morales in 1 week   Wound care: Keep dressings in place           Discharge Disposition:   Discharged to home     Lencho Sanchez MD, MD

## 2018-02-13 ENCOUNTER — OFFICE VISIT (OUTPATIENT)
Dept: PLASTIC SURGERY | Facility: CLINIC | Age: 23
End: 2018-02-13

## 2018-02-13 DIAGNOSIS — Z90.13 S/P BILATERAL MASTECTOMY: ICD-10-CM

## 2018-02-13 DIAGNOSIS — Z98.890 POSTOPERATIVE STATE: Primary | ICD-10-CM

## 2018-02-13 NOTE — LETTER
Date:February 21, 2018      Patient was self referred, no letter generated. Do not send.        [facility] Health Information

## 2018-02-13 NOTE — PATIENT INSTRUCTIONS
Post Transgender Mastectomy Instructions    May shower with water spray to your back for the first week.    Change dressing after showering or once daily.  Care of your new nipples:  1. Carefully remove the old dressing making sure it s not sticking to or lifting up the grafts. (if you feel it is sticking you can get it slightly damp by spraying the microklenz on the gauze to help it lift up).  2. Liberally spray a 4x4 gauze with microklenz spray then gently pat nipples with the wet gauze and allow to air dry.  Do Not Rub!  3. Cut a piece of adaptic (curity non adherent or Vaseline coated dressing) into 4 pieces.  (remember to save the leftover 2 pieces in a ziplock bag).  4. Place one piece of the cut adaptic over each nipple.  5. Fold a 2x2 gauze in half and place on top of the adaptic.  6. Carefully place the tegaderm protective cover over the top.  7. Change the dressing for the next 7 days.  8. Ace wrap for the next 7 days as well. (this is to help with any swelling).  You can add an extra 3 more days of dressing changes if you choose but you only need to cover the nipples for 7 days.     The glue strip over your incision will start to peel up and fall off after about 2 weeks but if after 3 weeks the glue strip is still in place you may need to help it come off in the shower.      Post-surgery Nipple FAQ S    1) Once I'm no longer using nipple dressings or the Ace wrap, do I just not put anything on my nipples?   No you don't have to.  Remember it is ok if each side is behaving differently in healing.   Should I be moisturizing them with anything, or is it better not to?    You can moisturize if you would like but usually not until after week 3.     2) When can I let the spray from my shower head hit my chest directly?   Once you are done with the nipple dressings you may shower like you normally do.  And should I be washing my chest with soap, or just rinsing it?   You may wash with soap but remember soap can  dry the skin and we don t recommend daily soap for your skin except for critical areas  After you are done with the nipple dressings, you may wash with soap but treat the nipples gently, so no washcloth or anything else rough for 8 weeks     3) When can I start wearing shirts that pull over my head instead of buttoning/zipping in the front?   As soon as it's not sore to do so, probably within 3-4 weeks.     4) I forget what you said about how much I can lift and when?   You will be on a 5 pound lifting restriction for 4 weeks.  Let your body be your guide, increase in 2 to 5lb increments. If frequent 50 pound lifting is typical, you can resume this again at 8 weeks from surgery     5) When can I start reaching my arms over my head?  After 2-3 weeks,  but gently.      6) When can I start swimming again?  After 6-8 weeks     7) I've been sleeping on my back, but I'm wondering when it's okay to sleep on my side.    After 3-4 weeks as tolerated.     8) When can I start to massage my scars?   Don t start until at least 3 weeks post op but then you can start gently massaging your scars whenever you like, though move in the direction of the incision.     9) What will reduce the scar?   Scars will lighten with time, approximately in one year. Sun exposure however will darken them so if you are concerned use sunscreen on the scars. Other scar reducing products are unproven but okay to try if you want.  Scarring depends on genetics, tension on the tissues during activities.    There are various options none proven and none covered by insurance.   Silicone strips - oils - vitamin E - Mederma - Frankincense, etc.      10) When are the nipples done sloughing?   Old nipple skin will peel off when new graft underneath has healed usually around 2-3 weeks post op.     11) What happens if there is drainage?   Keep clean and dry cover with gauze and bandaid. Ask about topical medications.    Always call the nurse at [phone] or  [phone] if you are concerned.

## 2018-02-13 NOTE — LETTER
2/13/2018       RE: Andres Beal  [address]   [place] [id number]     Dear Colleague,    Thank you for referring your patient, Andres Beal, to the [facility] PLASTIC AND RECONSTRUCTIVE SURGERY at [facility]. Please see a copy of my visit note below.    PLASTICS POSTOP   Dictation on: 02/13/2018 12:47 PM by: JOHANNA HUTSON [[id number]]         Again, thank you for allowing me to participate in the care of your patient.      Sincerely,    Rianna Hutson MD

## 2018-02-13 NOTE — PROGRESS NOTES
PLASTICS POSTOP  This is a 22-year-old trans male who underwent bilateral subcu mastectomies with nipple grafts last week Wednesday.  Other than staying overnight for some protracted nausea, he did very well postoperatively.  For pain, he is really only using Tylenol and no narcotics.  He feels that the On-Q catheters were helpful.  He denies any itching or any constipation.  The JPs were low enough output that they are ready to come out today and the On-Q was empty so everything was removed without difficulty.  The nipple bolster dressings were removed as well and show 100% take so far.  He has good symmetry, good contour.  There is still some fullness kind of in the anterior axillary fold and this was not addressed that aggressively.  Dermabond Prineo is intact.  There is mild ecchymosis surrounding the nipple grafts, but otherwise, he is looking pretty good.  He has absolutely no dog ear deformities.  We will continue with nipple graft dressings for another week.  He can shower now with those dressings in place.  Ace wrap needs to be continued until next Monday.  He needs to continue his limitations of activities for another 2 weeks.  Once the Prineo comes off in for another 2 weeks, he can start to massage or use silicone tape for his incisions.  We will see him back in 2 months.  If he has any other issues or concerns prior to that, he can come and follow up with us should he develop sites of hematoma or seroma or any additional problems.

## 2018-04-17 ENCOUNTER — OFFICE VISIT (OUTPATIENT)
Dept: PLASTIC SURGERY | Facility: CLINIC | Age: 23
End: 2018-04-17
Payer: COMMERCIAL

## 2018-04-17 VITALS
OXYGEN SATURATION: 100 % | WEIGHT: 219 LBS | HEART RATE: 90 BPM | SYSTOLIC BLOOD PRESSURE: 111 MMHG | BODY MASS INDEX: 36.49 KG/M2 | HEIGHT: 65 IN | DIASTOLIC BLOOD PRESSURE: 40 MMHG

## 2018-04-17 DIAGNOSIS — L90.6 SKIN STRIAE: ICD-10-CM

## 2018-04-17 DIAGNOSIS — Z90.13 S/P BILATERAL MASTECTOMY: Primary | ICD-10-CM

## 2018-04-17 ASSESSMENT — PAIN SCALES - GENERAL: PAINLEVEL: NO PAIN (0)

## 2018-04-17 NOTE — LETTER
Date:April 19, 2018      Patient was self referred, no letter generated. Do not send.        [facility] Information

## 2018-04-17 NOTE — NURSING NOTE
"Chief Complaint   Patient presents with     Surgical Followup     post op        Vitals:    04/17/18 1435   BP: 111/40   Pulse: 90   SpO2: 100%   Weight: 219 lb   Height: 5' 5\"       Body mass index is 36.44 kg/(m^2).      WOUND EVALUATION:                        "

## 2018-04-17 NOTE — MR AVS SNAPSHOT
"              After Visit Summary   4/17/2018    Andres Beal    MRN: [id number]           Patient Information     Date Of Birth          1995        Visit Information        Provider Department      4/17/2018 2:30 PM Rianna Morales MD [facility] Plastic [facility] Reconstructive Surgery        Today's Diagnoses     S/P bilateral mastectomy    -  1    Skin striae           Follow-ups after your visit        Who to contact     Please call your clinic at [phone] to:    Ask questions about your health    Make or cancel appointments    Discuss your medicines    Learn about your test results    Speak to your doctor            Additional Information About Your Visit        MyChart Information     Earnest gives you secure access to your electronic health record. If you see a primary care provider, you can also send messages to your care team and make appointments. If you have questions, please call your primary care clinic.  If you do not have a primary care provider, please call [phone] and they will assist you.      Earnest is an electronic gateway that provides easy, online access to your medical records. With E[facility]est, you can request a clinic appointment, read your test results, renew a prescription or communicate with your care team.     To access your existing account, please contact your [facility] or call [phone] for assistance.        Care EveryWhere ID     This is your Care EveryWhere ID. This could be used by other organizations to access your [facility] medical records  [id number]        Your Vitals Were     Pulse Height Pulse Oximetry BMI (Body Mass Index)          90 5' 5\" 100% 36.44 kg/m2         Blood Pressure from Last 3 Encounters:   04/17/18 111/40   02/08/18 111/44   01/23/18 120/80    Weight from Last 3 Encounters:   04/17/18 219 lb   02/07/18 219 lb 9.3 oz   01/23/18 238 lb              Today, you had the following     No orders found for display    "    Primary Care Provider Fax #    Provider Not In System [phone]                Equal Access to Services     CARLOS CHEKO : Hadii sandy Mckeon, lissa ling, marylatia riverajaneneheidy barros, cristela alexandreanggali noe. So [facility] [phone].    ATENCIÓN: Si habla español, tiene a smith disposición servicios gratuitos de asistencia lingüística. Llame al [phone].    We comply with applicable federal civil rights laws and [place] laws. We do not discriminate on the basis of race, color, national origin, age, disability, sex, sexual orientation, or gender identity.            Thank you!     Thank you for choosing [facility] PLASTIC AND RECONSTRUCTIVE SURGERY  for your care. Our goal is always to provide you with excellent care. Hearing back from our patients is one way we can continue to improve our services. Please take a few minutes to complete the written survey that you may receive in the mail after your visit with us. Thank you!             Your Updated Medication List - Protect others around you: Learn how to safely use, store and throw away your medicines at www.disposemymeds.org.          This list is accurate as of 4/17/18 11:59 PM.  Always use your most recent med list.                   Brand Name Dispense Instructions for use Diagnosis    fluticasone 50 MCG/ACT spray    FLONASE     Spray 1 spray into both nostrils daily        LORazepam 1 MG tablet    ATIVAN    4 tablet    Take 0.5-1 tablets (0.5-1 mg) by mouth every 8 hours as needed for anxiety Take 30 minutes prior to departure.  Do not operate a vehicle after taking this medication    Gender dysphoria in adolescent and adult       testosterone cypionate 200 MG/ML injection    DEPOTESTOTERONE     Inject 200 mg into the muscle every 14 days

## 2018-04-17 NOTE — PROGRESS NOTES
POSTOP FOLLOW UP  This is a 22-year-old Chinese male who is here with his partner for followup.  He had bilateral subcu mastectomies with nipple grafts back on 02/07/2018, and is doing pretty well.  Overall, I think he is happy with the results.  There is, however, what he describes as a scar chunk involving the left nipple graft that seems to have appeared after we salvaged that nipple graft in clinic from a hematoma.  It sounds like it was mercedez early on and that has caused it to be more prominent and smaller than the contralateral nipple graft.  Other than a little bit of tightness along the lateral right thigh, he has full range of motion.  Interestingly, he started to develop some horizontal stria 3-4 weeks ago that are more vertically oriented on the left side when he raises his arm above his head, but more horizontally oriented when he pulls his arm back on the right side.  There is a little bit of fullness bilaterally along the posterior incisions, and also some lipodystrophy over the anterior axillary folds along the pec bilaterally.  Otherwise, he has pretty good symmetry and contour.  The nipple grafts are still somewhat hypopigmented.  He is not really bothered by much of any of this.  He is using some silicone strips and some lotion p.r.n. for his incisions, but other than a little bit of hypertrophy, more so on the right than the left, he really has a pretty nice result that is still in the process of remodeling.  At this point, photos were taken with verbal permission.  I have asked him to return at the 1-year janell, so that we can see how everything is going with the scars and grafting, etc., and he seems willing to do that.  We will let him make that appointment as he can based on his schedule.

## 2020-03-10 ENCOUNTER — HEALTH MAINTENANCE LETTER (OUTPATIENT)
Age: 25
End: 2020-03-10

## 2020-12-27 ENCOUNTER — HEALTH MAINTENANCE LETTER (OUTPATIENT)
Age: 25
End: 2020-12-27

## 2021-04-24 ENCOUNTER — HEALTH MAINTENANCE LETTER (OUTPATIENT)
Age: 26
End: 2021-04-24

## 2021-10-09 ENCOUNTER — HEALTH MAINTENANCE LETTER (OUTPATIENT)
Age: 26
End: 2021-10-09

## 2022-05-16 ENCOUNTER — HEALTH MAINTENANCE LETTER (OUTPATIENT)
Age: 27
End: 2022-05-16

## 2022-09-11 ENCOUNTER — HEALTH MAINTENANCE LETTER (OUTPATIENT)
Age: 27
End: 2022-09-11

## 2023-06-03 ENCOUNTER — HEALTH MAINTENANCE LETTER (OUTPATIENT)
Age: 28
End: 2023-06-03

## (undated) DEVICE — EYE MARKING PAD 581057

## (undated) DEVICE — GLOVE PROTEXIS MICRO 6.5  2D73PM65

## (undated) DEVICE — SU DERMABOND PRINEO CLR602US

## (undated) DEVICE — ESU GROUND PAD UNIVERSAL W/O CORD

## (undated) DEVICE — SU VICRYL 3-0 FS-1 27" J442H

## (undated) DEVICE — ESU PENCIL W/SMOKE EVAC NEPTUNE STRYKER 0703-046-000

## (undated) DEVICE — LINEN TOWEL PACK X5 5464

## (undated) DEVICE — LINEN ORTHO PACK 5446

## (undated) DEVICE — DRSG COTTON BALL 6PK LCB62

## (undated) DEVICE — SU ETHILON 3-0 FS-1 18" 663G

## (undated) DEVICE — BLADE KNIFE SURG 10 371110

## (undated) DEVICE — PAD ARMBOARD FOAM EGGCRATE COVIDEN 3114367

## (undated) DEVICE — SUCTION MANIFOLD DORNOCH ULTRA CART UL-CL500

## (undated) DEVICE — ESU ELEC BLADE 6" COATED/INSULATED E1455-6

## (undated) DEVICE — PREP CHLORAPREP 26ML TINTED ORANGE  260815

## (undated) DEVICE — DRAIN JACKSON PRATT RESERVOIR 100ML SU130-1305

## (undated) DEVICE — DRSG XEROFORM 5X9" 8884431605

## (undated) DEVICE — TUBING SUCTION MEDI-VAC 1/4"X20' N620A

## (undated) DEVICE — BNDG ELASTIC 6" DBL LENGTH UNSTERILE 6611-16

## (undated) DEVICE — PEN MARKING SKIN W/LABELS 31145918

## (undated) DEVICE — SOL ADH LIQUID BENZOIN SWAB 0.6ML C1544

## (undated) DEVICE — ESU BLADE PEAK PLASMA 3.0S PS210-030S

## (undated) DEVICE — SU VICRYL 2-0 CT-1 27" UND J259H

## (undated) DEVICE — SU SILK 2-0 TIE 12X30" A305H

## (undated) DEVICE — LIGHT HANDLE X2

## (undated) DEVICE — DRSG KERLIX 4 1/2"X4YDS ROLL 6730

## (undated) DEVICE — SYR BULB IRRIG 50ML LATEX FREE 0035280

## (undated) DEVICE — STPL SKIN 35W APPOSE 8886803712

## (undated) DEVICE — HEADREST FOAM 9" PINK

## (undated) DEVICE — PAD CHUX UNDERPAD 30X30"

## (undated) DEVICE — DRSG TEGADERM 2 3/8X2 3/4" 1624W

## (undated) DEVICE — DRAIN JACKSON PRATT 15FR ROUND SU130-1323

## (undated) DEVICE — GOWN XLG DISP 9545

## (undated) DEVICE — SU SILK 0 SH 30" K834H

## (undated) DEVICE — BNDG ELASTIC 6"X5YDS UNSTERILE 6611-60

## (undated) DEVICE — WIPES FOLEY CARE SURESTEP PROVON DFC100

## (undated) DEVICE — Device

## (undated) DEVICE — SPONGE LAP 18X18" X8435

## (undated) DEVICE — DRAPE LAP TRANSVERSE 29421

## (undated) DEVICE — SU VICRYL 4-0 PS-1 18" UND J682G

## (undated) DEVICE — BLADE KNIFE SURG 15 371115

## (undated) DEVICE — CATH TRAY FOLEY SURESTEP 16FR WDRAIN BAG STLK LATEX A300316A

## (undated) DEVICE — NDL 18GA 1.5" 305196

## (undated) DEVICE — STRAP KNEE/BODY 31143004

## (undated) DEVICE — DECANTER TRANSFER DEVICE 2008S

## (undated) DEVICE — NDL COUNTER 20CT 31142493

## (undated) DEVICE — SU PLAIN FAST ABSORB 5-0 PC-1 18" 1915G

## (undated) RX ORDER — ROCURONIUM BROMIDE 50 MG/5 ML
SYRINGE (ML) INTRAVENOUS
Status: DISPENSED
Start: 2018-02-07

## (undated) RX ORDER — CEFAZOLIN SODIUM 1 G/3ML
INJECTION, POWDER, FOR SOLUTION INTRAMUSCULAR; INTRAVENOUS
Status: DISPENSED
Start: 2018-02-07

## (undated) RX ORDER — CEFAZOLIN SODIUM 2 G/100ML
INJECTION, SOLUTION INTRAVENOUS
Status: DISPENSED
Start: 2018-02-07

## (undated) RX ORDER — SCOLOPAMINE TRANSDERMAL SYSTEM 1 MG/1
PATCH, EXTENDED RELEASE TRANSDERMAL
Status: DISPENSED
Start: 2018-02-07

## (undated) RX ORDER — LIDOCAINE HYDROCHLORIDE 20 MG/ML
INJECTION, SOLUTION EPIDURAL; INFILTRATION; INTRACAUDAL; PERINEURAL
Status: DISPENSED
Start: 2018-02-07

## (undated) RX ORDER — ALBUTEROL SULFATE 90 UG/1
AEROSOL, METERED RESPIRATORY (INHALATION)
Status: DISPENSED
Start: 2018-02-07

## (undated) RX ORDER — FENTANYL CITRATE 50 UG/ML
INJECTION, SOLUTION INTRAMUSCULAR; INTRAVENOUS
Status: DISPENSED
Start: 2018-02-07

## (undated) RX ORDER — SODIUM CHLORIDE, SODIUM LACTATE, POTASSIUM CHLORIDE, CALCIUM CHLORIDE 600; 310; 30; 20 MG/100ML; MG/100ML; MG/100ML; MG/100ML
INJECTION, SOLUTION INTRAVENOUS
Status: DISPENSED
Start: 2018-02-07

## (undated) RX ORDER — ACETAMINOPHEN 325 MG/1
TABLET ORAL
Status: DISPENSED
Start: 2018-02-07

## (undated) RX ORDER — GABAPENTIN 300 MG/1
CAPSULE ORAL
Status: DISPENSED
Start: 2018-02-07

## (undated) RX ORDER — ONDANSETRON 2 MG/ML
INJECTION INTRAMUSCULAR; INTRAVENOUS
Status: DISPENSED
Start: 2018-02-07